# Patient Record
Sex: MALE | Race: WHITE | NOT HISPANIC OR LATINO | URBAN - METROPOLITAN AREA
[De-identification: names, ages, dates, MRNs, and addresses within clinical notes are randomized per-mention and may not be internally consistent; named-entity substitution may affect disease eponyms.]

---

## 2022-06-21 ENCOUNTER — INPATIENT (INPATIENT)
Facility: HOSPITAL | Age: 82
LOS: 10 days | Discharge: HOME | DRG: 949 | End: 2022-07-02
Attending: INTERNAL MEDICINE | Admitting: INTERNAL MEDICINE
Payer: MEDICARE

## 2022-06-21 VITALS
HEIGHT: 68 IN | TEMPERATURE: 98 F | OXYGEN SATURATION: 94 % | DIASTOLIC BLOOD PRESSURE: 103 MMHG | HEART RATE: 86 BPM | WEIGHT: 181.22 LBS | SYSTOLIC BLOOD PRESSURE: 145 MMHG | RESPIRATION RATE: 16 BRPM

## 2022-06-21 DIAGNOSIS — Z98.890 OTHER SPECIFIED POSTPROCEDURAL STATES: Chronic | ICD-10-CM

## 2022-06-21 DIAGNOSIS — Z90.49 ACQUIRED ABSENCE OF OTHER SPECIFIED PARTS OF DIGESTIVE TRACT: Chronic | ICD-10-CM

## 2022-06-21 DIAGNOSIS — S12.000A UNSPECIFIED DISPLACED FRACTURE OF FIRST CERVICAL VERTEBRA, INITIAL ENCOUNTER FOR CLOSED FRACTURE: ICD-10-CM

## 2022-06-21 RX ORDER — METOPROLOL TARTRATE 50 MG
25 TABLET ORAL DAILY
Refills: 0 | Status: DISCONTINUED | OUTPATIENT
Start: 2022-06-22 | End: 2022-07-02

## 2022-06-21 RX ORDER — POLYETHYLENE GLYCOL 3350 17 G/17G
17 POWDER, FOR SOLUTION ORAL DAILY
Refills: 0 | Status: DISCONTINUED | OUTPATIENT
Start: 2022-06-21 | End: 2022-06-22

## 2022-06-21 RX ORDER — LANOLIN ALCOHOL/MO/W.PET/CERES
3 CREAM (GRAM) TOPICAL AT BEDTIME
Refills: 0 | Status: DISCONTINUED | OUTPATIENT
Start: 2022-06-21 | End: 2022-07-02

## 2022-06-21 RX ORDER — ACETAMINOPHEN 500 MG
650 TABLET ORAL EVERY 6 HOURS
Refills: 0 | Status: DISCONTINUED | OUTPATIENT
Start: 2022-06-21 | End: 2022-07-02

## 2022-06-21 RX ORDER — LIDOCAINE 4 G/100G
1 CREAM TOPICAL
Refills: 0 | Status: DISCONTINUED | OUTPATIENT
Start: 2022-06-21 | End: 2022-07-02

## 2022-06-21 RX ORDER — PANTOPRAZOLE SODIUM 20 MG/1
40 TABLET, DELAYED RELEASE ORAL
Refills: 0 | Status: DISCONTINUED | OUTPATIENT
Start: 2022-06-21 | End: 2022-07-02

## 2022-06-21 RX ORDER — OXYCODONE HYDROCHLORIDE 5 MG/1
5 TABLET ORAL EVERY 4 HOURS
Refills: 0 | Status: DISCONTINUED | OUTPATIENT
Start: 2022-06-21 | End: 2022-06-21

## 2022-06-21 RX ORDER — SENNA PLUS 8.6 MG/1
2 TABLET ORAL AT BEDTIME
Refills: 0 | Status: DISCONTINUED | OUTPATIENT
Start: 2022-06-21 | End: 2022-06-22

## 2022-06-21 RX ORDER — ENOXAPARIN SODIUM 100 MG/ML
40 INJECTION SUBCUTANEOUS EVERY 24 HOURS
Refills: 0 | Status: DISCONTINUED | OUTPATIENT
Start: 2022-06-21 | End: 2022-06-22

## 2022-06-21 RX ADMIN — LIDOCAINE 1 PATCH: 4 CREAM TOPICAL at 17:49

## 2022-06-21 RX ADMIN — Medication 3 MILLIGRAM(S): at 20:35

## 2022-06-21 RX ADMIN — POLYETHYLENE GLYCOL 3350 17 GRAM(S): 17 POWDER, FOR SOLUTION ORAL at 17:50

## 2022-06-21 RX ADMIN — Medication 650 MILLIGRAM(S): at 20:19

## 2022-06-21 RX ADMIN — Medication 650 MILLIGRAM(S): at 21:22

## 2022-06-21 RX ADMIN — LIDOCAINE 1 PATCH: 4 CREAM TOPICAL at 19:00

## 2022-06-21 NOTE — PATIENT PROFILE ADULT - FALL HARM RISK - HARM RISK INTERVENTIONS

## 2022-06-21 NOTE — H&P ADULT - NSHPSOCIALHISTORY_GEN_ALL_CORE
SOCIAL HISTORY  Smoking - denies  EtOH - occasional  Marital Status -     FUNCTIONAL HISTORY      Previous Functional Status:  Independent in ambulation, ADL's, transfers prior to hospitalization    Current Functional Status:  Bed mobility:  Transfers:  Gait / ambulation:  ADL's:  Speech: SOCIAL HISTORY  Smoking - denies  EtOH - occasional  Marital Status -   Retired     FUNCTIONAL HISTORY  Patient lives with spouse in two homes, one in [place] with no SALOMÓN and no steps inside. [place] home is split level with 4-5 SALOMÓN and 4 SALOMÓN to main floor where bathroom and bedroom are located.  He was independent PTA without use of AD, played tennis, drives.  Adult daughter and son live locally. Son is Dr. Morris Bustos, surgical oncologist at [facility] [facility].    Current Functional Status:  Bed mobility:  Transfers:  Gait / ambulation:  ADL's:  Speech: SOCIAL HISTORY  Smoking - denies  EtOH - occasional  Marital Status -   Retired     FUNCTIONAL HISTORY  Patient lives with spouse in two homes, one in [place] with no SALOMÓN and no steps inside. [place] home is split level with 4-5 SALOMÓN and 4 SALOMÓN to main floor where bathroom and bedroom are located.  He was independent PTA without use of AD, played tennis, drives.  Adult daughter and son live locally. Son is Dr. Morris Bustos, surgical oncologist at [facility].    Current Functional Status:  Bed mobility:   Transfers:  Gait / ambulation:  ADL's:  Speech:

## 2022-06-21 NOTE — H&P ADULT - NSHPPHYSICALEXAM_GEN_ALL_CORE
Constitutional - NAD, Comfortable  HEENT - NCAT, EOMI  Neck - Supple, No limited ROM  Chest - good chest expansion, good respiratory effort, CTAB   Cardio - warm and well perfused, RRR, no murmur  Abdomen -  Soft, NTND  Extremities - No peripheral edema, No calf tenderness   Neurologic Exam:                    Cognitive -             Orientation: Awake, Alert, AAO to self, place, date, year, situation            Attention:  Days of week, recall 3 objects without cuing            Memory: Recent/ remote memory intact            Thought: process, content appropriate     Speech - Fluent, Comprehensible, No dysarthria, No aphasia      Cranial Nerves - No facial asymmetry, Tongue midline, EOMI, Shoulder shrug intact     Motor -                      LEFT    UE - ShAB 5/5, EF 5/5, EE 5/5, WE 5/5,  WNL                    RIGHT UE - ShAB 5/5, EF 5/5, EE 5/5, WE 5/5,  WNL                    LEFT    LE - HF 5/5, KE 5/5, DF 5/5, PF 5/5                    RIGHT LE - HF 5/5, KE 5/5, DF 5/5, PF 5/5        Sensory - Intact to LT bilateral     Reflexes - DTR _ / 4 , neg Felton's b/l, neg Babinski's b/l     Coordination - FTN / HTS intact     OculoVestibular -  No nystagmus  Psychiatric - Mood stable, Affect WNL  Skin on admission: Vital Signs Last 24 Hrs  T(C): 36.6 (21 Jun 2022 13:21), Max: 36.6 (21 Jun 2022 13:21)  T(F): 97.8 (21 Jun 2022 13:21), Max: 97.8 (21 Jun 2022 13:21)  HR: 86 (21 Jun 2022 13:21) (86 - 86)  BP: 145/103 (21 Jun 2022 13:21) (145/103 - 145/103)  RR: 16 (21 Jun 2022 13:21) (16 - 16)  SpO2: 94% (21 Jun 2022 13:21) (94% - 94%)    PHYSICAL EXAM  Constitutional - NAD, Comfortable  HEENT - NCAT, EOMI  Neck - Supple, No limited ROM  Chest - good chest expansion, good respiratory effort, CTAB   Cardio - regular HR, irregular rhythm  Abdomen -  Soft, NTND, decreased BS  Extremities - No peripheral edema, No calf tenderness. +right shin skin tear 2 cm x 2 cm. Both hands cool to touch, right hand edema  Neurologic Exam:                    Cognitive -             Orientation: Awake, Alert, AAO to self, place, date, year, situation            Memory: Recent/ remote memory intact            Thought: process, content appropriate     Speech - Fluent, Comprehensible, No dysarthria, No aphasia      Cranial Nerves - No facial asymmetry, Tongue midline, EOMI, Shoulder shrug intact     Motor -                      LEFT    UE - ShAB 5/5, EF 5/5, EE 5/5, WE 5/5,  WNL                    RIGHT UE - ShAB 3/5 (limited by pain with AROM restricted), EF 5/5, EE 5/5, WE 4/5,  3/5, finger abduction 2/5                    LEFT    LE - HF 5/5, KE 5/5, DF 5/5, PF 5/5                    RIGHT LE - HF 5/5, KE 5/5, DF 5/5, PF 5/5        Sensory - Intact to LT bilateral     Reflexes - DTR 2 / 4 , neg Felton's b/l, neg Babinski's b/l     Coordination - FTN / HTS intact     OculoVestibular -  No nystagmus  Psychiatric - Mood stable, Affect WNL  Skin on admission:

## 2022-06-21 NOTE — H&P ADULT - NSHPLABSRESULTS_GEN_ALL_CORE
Laboratory-Chemistry:    06/19/2022    Glucose: 101    Sodium: 138    Potassium: 4    Blood Urea Nitrogen : 13    Creatinine: 0.89    Hematology:    06/19/2022    WBC: 5.60    HgB: 12.5    Hct: 37    Platelets: 120       Cultures:     06/15/2022 COVID-19 PCR: Not detected       Radiology:     CXR 06/18:  Likely right basilar atelectasis    CXR 06/17:  No acute pulmonary process.    MRI brain 06/16: No acute intracranial finding    MRI CTL-spine 06/16:    Cervical Spine: Minimally displaced fracture involving spinous process of C2 with extension into the spinal canal. No evidence of epidural or subdural hematoma. Degenerative changes. No cord compression  Thoracic spine: No cord compression  Lumbar spine:  Degenerative changes    MRI brachial plexus, shoulder 06/17: Unremarkable examination of the brachial plexus.    MRI (??) shoulder 06/16: Moderate acromioclavicular and moderate glenohumeral arthrosis.  Posterior labral  tearing with mild cartilage loss and adjacent subchondral marrow edema posterior glenoid.  Tendinosis supraspinatus with suspected low grade articular sided tearing anterior most fibers.  Mild tendinosis infraspinatus and intra articular long head biceps.  Loss of fat and edema within the rotator interval.    06/16/2022 EKG:  A fib, incomplete Right BBB

## 2022-06-21 NOTE — H&P ADULT - NSHPREVIEWOFSYSTEMS_GEN_ALL_CORE
REVIEW OF SYSTEMS  Constitutional: No fever, No Chills, No fatigue  HEENT: No eye pain, No visual disturbances, No difficulty hearing  Pulm: No cough,  No shortness of breath  Cardio: No chest pain, No palpitations  GI:  No abdominal pain, No nausea, No vomiting, No diarrhea, No constipation  : No dysuria, No frequency, No hematuria  Neuro: No headaches, No memory loss, No loss of strength, No numbness, No tremors  Skin: No itching, No rashes, No lesions   Endo: No temperature intolerance  MSK: No joint pain, No joint swelling, No muscle pain, No Neck or back pain  Psych:  No depression, No anxiety REVIEW OF SYSTEMS  Constitutional: No fever, No Chills, No fatigue  HEENT: No eye pain, No visual disturbances, + difficulty hearing, +c-collar  Pulm: No cough,  No shortness of breath  Cardio: No chest pain, No palpitations  GI:  No abdominal pain, No nausea, No vomiting, No diarrhea, + constipation, Last BM 6/19  : No dysuria, No frequency, No hematuria  Neuro: No headaches, No memory loss, + loss of strength, + numbness and tingling in bilateral feet and hands, No tremors  Skin: No itching, No rashes, +right shin skin tear  Endo: No temperature intolerance, +cold hands  MSK: +right shoulder pain, No joint swelling, No muscle pain, No Neck or back pain  Psych:  No depression, No anxiety

## 2022-06-21 NOTE — H&P ADULT - ATTENDING COMMENTS
Seen and examined    80 yo Male with Hx Afib on Warfarin, HTN, right rotator cuff tear.  Initially admitted to [facility] 6/15/22 after a fall at home, Dx with C2 fracture and transferred to [facility], Neurosurg. recommended non surgical management. Hospital course c/b RUE weakness and bilateral shoulder pain. Dx with right rotator cuff tear. MRI R brachial plexus unremarkable. Plan for outpatient EMG.  Maintain [place] J collar as noted  Acute rehab admission 6/21/22.    Pleasant, wife participated on phone during review  Fully independent, plays law tennis, no gait assistance device use  Fell backwards, while sitting on a chair, playing cards with friends, no LOC or seizures.    Now has tingling/numbness diffusely b/l UE worse on fingers, no neck pain  Has low intensity/intermittent Rt low back pain, non radiatory, no bowel/bladder dysfunction of saddle anesthesia  Gait is unstable  On coumadin at home for A fib    Clinically stable  HEENT--C collar in situ, no skin issues neck area  Clear chest   Abd soft  Ext--skin tear Rt mid shin-pre tibial area 2x2cm with some erythema    AAO X 3  Tone normal  MMT--5/5 except RUE 3+/5 due to shoulder pain with movement, Abd to 80deg flexion to 100deg, pain on end ROM  Mild TTP diffused over Rt shoulder  Reflexes 2+ upper brisk both patella  Tinel's and Phalen ? +ve on right  Gait--increased sway -dynamic gait  No cognitive deficits                        13.5   5.30  )-----------( 131      ( 22 Jun 2022 05:35 )             39.4     06-22    137  |  103  |  20  ----------------------------<  103<H>  4.1   |  30  |  0.82    Ca    9.6      22 Jun 2022 05:35    TPro  6.2  /  Alb  3.3  /  TBili  0.8  /  DBili  x   /  AST  89<H>  /  ALT  88<H>  /  AlkPhos  53  06-22    PT/INR - ( 22 Jun 2022 05:35 )   PT: 14.2 sec;   INR: 1.22 ratio        Dx C2 Fracture  - Commence Comprehensive Multidisciplinary Rehab Program for Gait, ADL, Functional impairments  - PT/OT 3 hours a day 5 days a week  - C Collar for OOB activity- clarified with patient's neurosurgery team. May remove C collar while in bed or for showers when supervised, Continue C collar for 6-8 weeks until seen by neurosurgeon.  - Outpatient follow up with Dr. So ([phone], press 9)  - Precautions: Cervical spine, falls, aspiration    Afib--f/u plan as stated by hospitalist, commence coumadin today, and monitor INR, D/C Lovenox  Continue all supportive treatments    Skin tear right shin--bacitracin topical   Continue bowel regimen--senna nightly, worried about loose BM  est dc to be decided at team conference

## 2022-06-21 NOTE — H&P ADULT - NSICDXPASTSURGICALHX_GEN_ALL_CORE_FT
PAST SURGICAL HISTORY:  History of bilateral inguinal hernia repair     History of umbilical hernia repair     Status post appendectomy

## 2022-06-21 NOTE — H&P ADULT - ASSESSMENT
(age and gender) with hx of (pertinent PMH) who presented to (acute hospital) on (date of acute admission) with (presenting sx) found to have (rehab diagnosis). Hospital Course complicated by ___. Admitted for multidisciplinary rehab program    Comprehensive Multidisciplinary Rehab Program:  - Gait, ADL, Functional impairments  - CMS Impairment group:   - PT/OT/ SLP 3 hours a day 7 days a week  - NP consult for support and assessment     ***    Mood / Cognition:  - Neuropsych evaluation     Sleep:  - Maintain quiet hours and low stim environment  - Monitor sleep logs  - Melatonin PRN    Pain:  - Tylenol PRN  - avoid sedating meds that may affect cognitive recovery    GI/Bowel:  - Senna 2 tabs daily  - GI ppx:     /Bladder:  - Monitor PVR if no void in 8h; SC for >400 cc  - Toileting schedule q4h    Diet / Dysphagia:    - Diet:   - ongoing SLP assessment  - Nutrition to follow    Skin/ Pressure Injury Prevention:  - assessment on admission   - Incisions:  -Turn Q2hrs in bed while awake, OOB to Chair, PT/OT/SLP     DVT prophylaxis:  -   - SCDs  - Last doppler on ___    Precautions/ Restrictions  - Falls, Cardiac, Seizures, Spine, Hip  - Ortho:      Weight bearing status:     ROM restrictions:   - Lungs: Aspiration, Incentive Spirometer    - COVID PCR:     --------------------------------------------  Outpatient Follow up:  Neurology Clinic (for EMG)  [address] [place] [address]  [phone]  --Within 1 month    Orthopedics Clinic  [address] [place] [address]  [phone]  ---Within 6 weeks of acute hospital discharge    Trauma Physician  [address] [place] [address]  [phone]  -- Within 7-10 days  --------------------------------------------      MEDICAL PROGNOSIS: GOOD            REHAB POTENTIAL: GOOD             ESTIMATED DISPOSITION: HOME WITH HOME CARE            ELOS: 10-14 Days   EXPECTED THERAPY:     P.T. 1hr/day       O.T. 1hr/day      S.L.P. 1hr/day     P&O Unnecessary     EXP FREQUENCY: 5 days per 7 day period     PRESCREEN COMPARISION:   I have reviewed the prescreen information and I have found no relevant changes between the preadmission screening and my post admission evaluation     RATIONALE FOR INPATIENT ADMISSION - Patient demonstrates the following: (check all that apply)  [X] Medically appropriate for rehabilitation admission  [X] Has attainable rehab goals with an appropriate initial discharge plan  [X] Has rehabilitation potential (expected to make a significant improvement within a reasonable period of time)   [X] Requires close medical management by a rehab physician, rehab nursing care, Hospitalist and comprehensive interdisciplinary team (including PT, OT, & or SLP, Prosthetics and Orthotics)   82 yo RHD Male with PMhx of Afib on Warfarin, HTN, right rotator cuff tear presented to [facility] 6/15/22 after he hit the back of his head falling off a chair. Found to have C2 fracture and transferred to [facility], non surgical management. Hospital course c/b RUE weakness and bilateral shoulder pain. MRI spine was done for persistent RUE weakness showed no additional injuries. C Maintain [place] J collar at all times. Admitted for multidisciplinary rehab program 6/21/22.    #C2 Fracture  - Start Comprehensive Multidisciplinary Rehab Program for Gait, ADL, Functional impairments  - PT/OT 3 hours a day 5 days a week  - C Collar at all times for 6-8 weeks until seen by neurosurgeon  - Pt states surgeon told him he could take off C-collar in bed and for showers but instructions not specified in dc instructions --- 6/21 msg left for Dr. So for clarification  - Outpatient follow up with Dr. So ([phone], press 9)  - Precautions: Cervical spine, falls, aspiration    #Afib  - metoprolol succinate ER 25 mg daily  - Resume Coumadin on 6/22 -- currently on Lovenox- clarify with hospitalist if bridging is needed  - At home patient takes  5mg 5 days/week and 2.5mg 2 days per week.     #Right hand weakness  - Will need UE EMG in 3-4 weeks  - MRI of brachial plexus on 6/17 unremarkable    #Right rotator cuff tear  #Bilateral shoulder pain  - Lidocaine patches to both shoulders  - Cold/warm compresses PRN    #Migraines  - Pt takes Immitrex 25mg daily as needed, may repeat after 2 hrs if headache not aborted  - Hold medication, order PRN if needed    Sleep:  - Maintain quiet hours and low stim environment  - Melatonin 3mg at qhs    Pain:  - Tylenol PRN, lidocaine patches to shoulders, cold compresses PRN    GI/Bowel:  - Senna 2 tabs at qhs, Miralax daily    /Bladder:  - Monitor PVR on admission; SC for >400 cc    Diet: Regular     Skin/ Pressure Injury Prevention:  - assessment on admission-- skin tear on right shin approx 2cm x 2 xm, no pressure ulcers  - Incisions: none  -OOB to Chair, PT/OT     DVT prophylaxis:  - Lovenox 40  - start Coumadin on 6/22  - SCDs      --------------------------------------------  Outpatient Follow up:  Neurology Clinic (for EMG)  [address] [place] [id number] [phone]  --Within 1 month    Orthopedics Clinic  [address] [place] [address]  [phone]  ---Within 6 weeks of acute hospital discharge    Trauma Physician  [address] [place] [address]  [phone]  -- Within 7-10 days  --------------------------------------------      MEDICAL PROGNOSIS: GOOD            REHAB POTENTIAL: GOOD             ESTIMATED DISPOSITION: HOME WITH HOME CARE            ELOS: 10-14 Days   EXPECTED THERAPY:     P.T. 2hr/day       O.T. 1hr/day      S.L.P. 0 hr/day     P&O Unnecessary     EXP FREQUENCY: 5 days per 7 day period     PRESCREEN COMPARISION:   I have reviewed the prescreen information and I have found no relevant changes between the preadmission screening and my post admission evaluation     RATIONALE FOR INPATIENT ADMISSION - Patient demonstrates the following: (check all that apply)  [X] Medically appropriate for rehabilitation admission  [X] Has attainable rehab goals with an appropriate initial discharge plan  [X] Has rehabilitation potential (expected to make a significant improvement within a reasonable period of time)   [X] Requires close medical management by a rehab physician, rehab nursing care, Hospitalist and comprehensive interdisciplinary team (including PT, OT, & or SLP, Prosthetics and Orthotics)   82 yo RHD Male with PMhx of Afib on Warfarin, HTN, right rotator cuff tear presented to [facility] 6/15/22 after he hit the back of his head falling off a chair. Found to have C2 fracture and transferred to [facility], non surgical management. Hospital course c/b RUE weakness and bilateral shoulder pain. Found to have right rotator cuff tear. MRI R brachial plexus unremarkable. Plan for outpatient EMG. C Maintain [place] J collar at all times. Admitted for multidisciplinary rehab program 6/21/22.    #C2 Fracture  - Start Comprehensive Multidisciplinary Rehab Program for Gait, ADL, Functional impairments  - PT/OT 3 hours a day 5 days a week  - C Collar at all times for 6-8 weeks until seen by neurosurgeon  - Pt states surgeon told him he could take off C-collar in bed and for showers but instructions not specified in dc instructions --- 6/21 msg left for Dr. So for clarification  - Outpatient follow up with Dr. So ([phone], press 9)  - Precautions: Cervical spine, falls, aspiration    #Afib  - metoprolol succinate ER 25 mg daily  - Resume Coumadin on 6/22 -- currently on Lovenox- clarify with hospitalist if bridging is needed  - At home patient takes  5mg 5 days/week and 2.5mg 2 days per week.     #Right hand weakness  - Will need UE EMG in 3-4 weeks  - MRI of brachial plexus on 6/17 unremarkable    #Right rotator cuff tear  #Bilateral shoulder pain  - Lidocaine patches to both shoulders  - Cold/warm compresses PRN    #Migraines  - Pt takes Immitrex 25mg daily as needed, may repeat after 2 hrs if headache not aborted  - Hold medication, order PRN if needed    Sleep:  - Maintain quiet hours and low stim environment  - Melatonin 3mg at qhs    Pain:  - Tylenol PRN, lidocaine patches to shoulders, cold compresses PRN    GI/Bowel:  - Senna 2 tabs at qhs, Miralax daily    /Bladder:  - Monitor PVR on admission; SC for >400 cc    Diet: Regular     Skin/ Pressure Injury Prevention:  - assessment on admission-- skin tear on right shin approx 2cm x 2 xm, no pressure ulcers  - Incisions: none  -OOB to Chair, PT/OT     DVT prophylaxis:  - Lovenox 40  - start Coumadin on 6/22  - SCDs      --------------------------------------------  Outpatient Follow up:  Neurology Clinic (for EMG)  [address] [place] [address]  [phone]  --Within 1 month    Orthopedics Clinic  [address] [place] [address]  [phone]  ---Within 6 weeks of acute hospital discharge    Trauma Physician  [address] [address]  [phone]  -- Within 7-10 days  --------------------------------------------      MEDICAL PROGNOSIS: GOOD            REHAB POTENTIAL: GOOD             ESTIMATED DISPOSITION: HOME WITH HOME CARE            ELOS: 10-14 Days   EXPECTED THERAPY:     P.T. 2hr/day       O.T. 1hr/day      S.L.P. 0 hr/day     P&O Unnecessary     EXP FREQUENCY: 5 days per 7 day period     PRESCREEN COMPARISION:   I have reviewed the prescreen information and I have found no relevant changes between the preadmission screening and my post admission evaluation     RATIONALE FOR INPATIENT ADMISSION - Patient demonstrates the following: (check all that apply)  [X] Medically appropriate for rehabilitation admission  [X] Has attainable rehab goals with an appropriate initial discharge plan  [X] Has rehabilitation potential (expected to make a significant improvement within a reasonable period of time)   [X] Requires close medical management by a rehab physician, rehab nursing care, Hospitalist and comprehensive interdisciplinary team (including PT, OT, & or SLP, Prosthetics and Orthotics)   80 yo RHD Male with PMhx of Afib on Warfarin, HTN, right rotator cuff tear presented to [facility] 6/15/22 after he hit the back of his head falling off a chair. Found to have C2 fracture and transferred to [facility], non surgical management. Hospital course c/b RUE weakness and bilateral shoulder pain. Found to have right rotator cuff tear. MRI R brachial plexus unremarkable. Plan for outpatient EMG. C Maintain [place] J collar at all times. Admitted for multidisciplinary rehab program 6/21/22.    #C2 Fracture  - Start Comprehensive Multidisciplinary Rehab Program for Gait, ADL, Functional impairments  - PT/OT 3 hours a day 5 days a week  - C Collar for OOB activity- clarified with patient's neurosurgery team. May remove C collar while in bed or for showers when supervised, Continue C collar for 6-8 weeks until seen by neurosurgeon.  - Outpatient follow up with Dr. So ([phone], press 9)  - Precautions: Cervical spine, falls, aspiration    #Afib  - metoprolol succinate ER 25 mg daily  - Resume Coumadin on 6/22 -- currently on Lovenox- clarify with hospitalist if bridging is needed  - At home patient takes  5mg 5 days/week and 2.5mg 2 days per week.     #Right hand weakness  - Will need UE EMG in 3-4 weeks  - MRI of brachial plexus on 6/17 unremarkable    #Right rotator cuff tear  #Bilateral shoulder pain  - Lidocaine patches to both shoulders  - Cold/warm compresses PRN    #Migraines  - Pt takes Immitrex 25mg daily as needed, may repeat after 2 hrs if headache not aborted  - Hold medication, order PRN if needed    Sleep:  - Maintain quiet hours and low stim environment  - Melatonin 3mg at qhs    Pain:  - Tylenol PRN, lidocaine patches to shoulders, cold compresses PRN    GI/Bowel:  - Senna 2 tabs at qhs, Miralax daily    /Bladder:  - Monitor PVR on admission; SC for >400 cc    Diet: Regular     Skin/ Pressure Injury Prevention:  - assessment on admission-- skin tear on right shin 2cm x 2 cm, no pressure ulcers  - Incisions: none  -OOB to Chair, PT/OT     DVT prophylaxis:  - Lovenox 40  - start Coumadin on 6/22  - SCDs      --------------------------------------------  Outpatient Follow up:  Neurology Clinic (for EMG)  [address] [place] [id number] [phone]  --Within 1 month    Orthopedics Clinic  [address]  [place] [id number] [phone]  ---Within 6 weeks of acute hospital discharge    Trauma Physician  [address] [place], [place] [id number] [phone]  -- Within 7-10 days  --------------------------------------------      MEDICAL PROGNOSIS: GOOD            REHAB POTENTIAL: GOOD             ESTIMATED DISPOSITION: HOME WITH HOME CARE            ELOS: 10-14 Days   EXPECTED THERAPY:     P.T. 2hr/day       O.T. 1hr/day      S.L.P. 0 hr/day     P&O Unnecessary     EXP FREQUENCY: 5 days per 7 day period     PRESCREEN COMPARISION:   I have reviewed the prescreen information and I have found no relevant changes between the preadmission screening and my post admission evaluation     RATIONALE FOR INPATIENT ADMISSION - Patient demonstrates the following: (check all that apply)  [X] Medically appropriate for rehabilitation admission  [X] Has attainable rehab goals with an appropriate initial discharge plan  [X] Has rehabilitation potential (expected to make a significant improvement within a reasonable period of time)   [X] Requires close medical management by a rehab physician, rehab nursing care, Hospitalist and comprehensive interdisciplinary team (including PT, OT, & or SLP, Prosthetics and Orthotics)

## 2022-06-21 NOTE — H&P ADULT - HISTORY OF PRESENT ILLNESS
Patient is a 82 yo M with PMhx of Afib on Warfarin, HTN, right rotator cuff tear who presented to [facility] 6/15/22 after he hit the back of his head falling off a chair. Found to have C2 fracture and transferred to [facility] for further management. Patient states he remembers the incident but could not speak or move at all afterward. Also presented with RUE weakness and bilateral shoulder pain. C-spine showed multilevel degenerative changes of C6-7 and C7-T1 with moderate narrowing of the central canal as well as moderate bilateral neural foraminal stenosis, endplate degenerative changes at C7-T1, grade 1 degenerative retrolithesis of C6-C7, and grade 1 degenerative anterolisthesis of C7-T1. 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 MRI spine was done for persistent RUE weakness showed no additional injuries. Conservative management per neurosurgery. Maintain [place] J collar at all times until follow up with Dr. So in 6-8 weeks. May resume Wafarin on 6/22. At home patient takes 5mg 5 days/week and 2.5mg 2 days per week. Will need UE EMG in 3-4 weeks.    Please obtain upright C spine XR??    . on Imitrex 25 mg daily as needed for migraine, may repeat dose in 2 hours.    Last Bm 6/19/22. Right shin skin tear. Numbness and tingling in extremities  Covid PCR neg 6/20 Patient is a 82 yo RHD Male with PMhx of Afib on Warfarin, HTN, right rotator cuff tear who presented to [facility] on 6/15/22 after he hit the back of his head falling off a chair. Found to have C2 fracture and transferred to [facility] for further management. Patient states he remembers the incident but could not speak or move at all afterward. Also presented with RUE weakness and bilateral shoulder pain. C-spine showed multilevel degenerative changes of C6-7 and C7-T1 with moderate narrowing of the central canal as well as moderate bilateral neural foraminal stenosis, endplate degenerative changes at C7-T1, grade 1 degenerative retrolithesis of C6-C7, and grade 1 degenerative anterolisthesis of C7-T1. 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 MRI shoulder (suspect Right shoulder given pt's complaint) showed moderate AC and GH arthrosis, posterior labral tear, supraspinatus tendinosis with suspected low grade articular sided tearing anterior most fibers, mild infraspinatus and long head of biceps tendinosis. MRI brachial plexus was for persistent RUE weakness was unremarkable. Conservative management per neurosurgery. Maintain [place] J collar at all times until follow up with Dr. So in 6-8 weeks. May resume Wafarin on 6/22. At home patient takes 5mg 5 days/week and 2.5mg 2 days per week. Will need UE EMG in 3-4 weeks.    Patient was evaluated by PM&R and therapy for functional deficits and gait/ ADL impairments and recommended acute rehabilitation. Patient was medically optimized for discharge to [facility] on 6/21/22.    Patient seen and examined on arrival. Patient states he was playing cards on his deck when his chair came off the edge of the deck and he fell backward, striking his head. He reports right shoulder pain since fall that is worse than his usual soreness after playing tennis. Unable to range right shoulder into full flexion. Also reports numbness and tingling worse in right hand than left hand, plus right hand edema.       Patient is a 82 yo RHD Male with PMhx of Afib on Warfarin, HTN, right rotator cuff tear who presented to [facility] on 6/15/22 after he hit the back of his head falling off a chair. Found to have C2 fracture and transferred to [facility] for further management. Patient states he remembers the incident but could not speak or move at all afterward. Also presented with RUE weakness and bilateral shoulder pain. C-spine showed multilevel degenerative changes of C6-7 and C7-T1 with moderate narrowing of the central canal as well as moderate bilateral neural foraminal stenosis, endplate degenerative changes at C7-T1, grade 1 degenerative retrolithesis of C6-C7, and grade 1 degenerative anterolisthesis of C7-T1. 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 MRI shoulder (suspect Right shoulder given pt's complaint) showed moderate AC and GH arthrosis, posterior labral tear, supraspinatus tendinosis with suspected low grade articular sided tearing anterior most fibers, mild infraspinatus and long head of biceps tendinosis. MRI brachial plexus was for persistent RUE weakness was unremarkable. Conservative management per neurosurgery. May resume Wafarin on 6/22. At home patient takes 5mg 5 days/week and 2.5mg 2 days per week. Will need UE EMG in 3-4 weeks.    Patient was evaluated by PM&R and therapy for functional deficits and gait/ ADL impairments and recommended acute rehabilitation. Patient was medically optimized for discharge to [facility] on 6/21/22.    Patient seen and examined on arrival. Patient states he was playing cards on his deck when his chair came off the edge of the deck and he fell backward, striking his head. He reports right shoulder pain since fall that is worse than his usual soreness after playing tennis. Unable to range right shoulder into full flexion. Also reports numbness and tingling worse in right hand than left hand, plus right hand edema.

## 2022-06-21 NOTE — H&P ADULT - NSICDXFAMILYHX_GEN_ALL_CORE_FT
FAMILY HISTORY:  No pertinent family history in first degree relatives FAMILY HISTORY:  Father  Still living? Unknown  Family hx of colon cancer, Age at diagnosis: Age Unknown

## 2022-06-22 DIAGNOSIS — S12.100A UNSPECIFIED DISPLACED FRACTURE OF SECOND CERVICAL VERTEBRA, INITIAL ENCOUNTER FOR CLOSED FRACTURE: ICD-10-CM

## 2022-06-22 LAB
ALBUMIN SERPL ELPH-MCNC: 3.3 G/DL — SIGNIFICANT CHANGE UP (ref 3.3–5)
ALP SERPL-CCNC: 53 U/L — SIGNIFICANT CHANGE UP (ref 40–120)
ALT FLD-CCNC: 88 U/L — HIGH (ref 10–45)
ANION GAP SERPL CALC-SCNC: 4 MMOL/L — LOW (ref 5–17)
AST SERPL-CCNC: 89 U/L — HIGH (ref 10–40)
BASOPHILS # BLD AUTO: 0.04 K/UL — SIGNIFICANT CHANGE UP (ref 0–0.2)
BASOPHILS NFR BLD AUTO: 0.8 % — SIGNIFICANT CHANGE UP (ref 0–2)
BILIRUB SERPL-MCNC: 0.8 MG/DL — SIGNIFICANT CHANGE UP (ref 0.2–1.2)
BUN SERPL-MCNC: 20 MG/DL — SIGNIFICANT CHANGE UP (ref 7–23)
CALCIUM SERPL-MCNC: 9.6 MG/DL — SIGNIFICANT CHANGE UP (ref 8.4–10.5)
CHLORIDE SERPL-SCNC: 103 MMOL/L — SIGNIFICANT CHANGE UP (ref 96–108)
CO2 SERPL-SCNC: 30 MMOL/L — SIGNIFICANT CHANGE UP (ref 22–31)
CREAT SERPL-MCNC: 0.82 MG/DL — SIGNIFICANT CHANGE UP (ref 0.5–1.3)
EGFR: 89 ML/MIN/1.73M2 — SIGNIFICANT CHANGE UP
EOSINOPHIL # BLD AUTO: 0.07 K/UL — SIGNIFICANT CHANGE UP (ref 0–0.5)
EOSINOPHIL NFR BLD AUTO: 1.3 % — SIGNIFICANT CHANGE UP (ref 0–6)
GLUCOSE SERPL-MCNC: 103 MG/DL — HIGH (ref 70–99)
HCT VFR BLD CALC: 39.4 % — SIGNIFICANT CHANGE UP (ref 39–50)
HGB BLD-MCNC: 13.5 G/DL — SIGNIFICANT CHANGE UP (ref 13–17)
IMM GRANULOCYTES NFR BLD AUTO: 0.4 % — SIGNIFICANT CHANGE UP (ref 0–1.5)
INR BLD: 1.22 RATIO — HIGH (ref 0.88–1.16)
LYMPHOCYTES # BLD AUTO: 1.46 K/UL — SIGNIFICANT CHANGE UP (ref 1–3.3)
LYMPHOCYTES # BLD AUTO: 27.5 % — SIGNIFICANT CHANGE UP (ref 13–44)
MCHC RBC-ENTMCNC: 30.5 PG — SIGNIFICANT CHANGE UP (ref 27–34)
MCHC RBC-ENTMCNC: 34.3 GM/DL — SIGNIFICANT CHANGE UP (ref 32–36)
MCV RBC AUTO: 89.1 FL — SIGNIFICANT CHANGE UP (ref 80–100)
MONOCYTES # BLD AUTO: 0.5 K/UL — SIGNIFICANT CHANGE UP (ref 0–0.9)
MONOCYTES NFR BLD AUTO: 9.4 % — SIGNIFICANT CHANGE UP (ref 2–14)
NEUTROPHILS # BLD AUTO: 3.21 K/UL — SIGNIFICANT CHANGE UP (ref 1.8–7.4)
NEUTROPHILS NFR BLD AUTO: 60.6 % — SIGNIFICANT CHANGE UP (ref 43–77)
NRBC # BLD: 0 /100 WBCS — SIGNIFICANT CHANGE UP (ref 0–0)
PLATELET # BLD AUTO: 131 K/UL — LOW (ref 150–400)
POTASSIUM SERPL-MCNC: 4.1 MMOL/L — SIGNIFICANT CHANGE UP (ref 3.5–5.3)
POTASSIUM SERPL-SCNC: 4.1 MMOL/L — SIGNIFICANT CHANGE UP (ref 3.5–5.3)
PROT SERPL-MCNC: 6.2 G/DL — SIGNIFICANT CHANGE UP (ref 6–8.3)
PROTHROM AB SERPL-ACNC: 14.2 SEC — HIGH (ref 10.5–13.4)
RBC # BLD: 4.42 M/UL — SIGNIFICANT CHANGE UP (ref 4.2–5.8)
RBC # FLD: 12.8 % — SIGNIFICANT CHANGE UP (ref 10.3–14.5)
SARS-COV-2 RNA SPEC QL NAA+PROBE: SIGNIFICANT CHANGE UP
SODIUM SERPL-SCNC: 137 MMOL/L — SIGNIFICANT CHANGE UP (ref 135–145)
WBC # BLD: 5.3 K/UL — SIGNIFICANT CHANGE UP (ref 3.8–10.5)
WBC # FLD AUTO: 5.3 K/UL — SIGNIFICANT CHANGE UP (ref 3.8–10.5)

## 2022-06-22 PROCEDURE — 99223 1ST HOSP IP/OBS HIGH 75: CPT

## 2022-06-22 RX ORDER — POLYETHYLENE GLYCOL 3350 17 G/17G
17 POWDER, FOR SOLUTION ORAL
Refills: 0 | Status: DISCONTINUED | OUTPATIENT
Start: 2022-06-22 | End: 2022-06-24

## 2022-06-22 RX ORDER — WARFARIN SODIUM 2.5 MG/1
5 TABLET ORAL ONCE
Refills: 0 | Status: COMPLETED | OUTPATIENT
Start: 2022-06-22 | End: 2022-06-22

## 2022-06-22 RX ORDER — SENNA PLUS 8.6 MG/1
2 TABLET ORAL AT BEDTIME
Refills: 0 | Status: DISCONTINUED | OUTPATIENT
Start: 2022-06-22 | End: 2022-07-02

## 2022-06-22 RX ADMIN — PANTOPRAZOLE SODIUM 40 MILLIGRAM(S): 20 TABLET, DELAYED RELEASE ORAL at 06:04

## 2022-06-22 RX ADMIN — LIDOCAINE 1 PATCH: 4 CREAM TOPICAL at 05:00

## 2022-06-22 RX ADMIN — WARFARIN SODIUM 5 MILLIGRAM(S): 2.5 TABLET ORAL at 21:04

## 2022-06-22 RX ADMIN — Medication 3 MILLIGRAM(S): at 21:04

## 2022-06-22 RX ADMIN — POLYETHYLENE GLYCOL 3350 17 GRAM(S): 17 POWDER, FOR SOLUTION ORAL at 11:47

## 2022-06-22 RX ADMIN — POLYETHYLENE GLYCOL 3350 17 GRAM(S): 17 POWDER, FOR SOLUTION ORAL at 21:05

## 2022-06-22 RX ADMIN — Medication 25 MILLIGRAM(S): at 06:03

## 2022-06-22 RX ADMIN — SENNA PLUS 2 TABLET(S): 8.6 TABLET ORAL at 21:05

## 2022-06-22 NOTE — DIETITIAN INITIAL EVALUATION ADULT - ORAL INTAKE PTA/DIET HISTORY
Patient Does Not Follow Diet @Home  Consumes 3 Meals a Day   Wife Usually Cooks For Patient   Does Take Vitamin/Supplements @Home (Multivitamin, Vitamin B Complex & Vitamin D)

## 2022-06-22 NOTE — CONSULT NOTE ADULT - SUBJECTIVE AND OBJECTIVE BOX
Patient is a 80 yo RHD Male with PMhx of Afib on Warfarin, HTN, right rotator cuff tear who presented to [facility] on 6/15/22 after he hit the back of his head falling off a chair. Found to have C2 fracture and transferred to [facility] for further management. Patient states he remembers the incident but could not speak or move at all afterward. Also presented with RUE weakness and bilateral shoulder pain. C-spine showed multilevel degenerative changes of C6-7 and C7-T1 with moderate narrowing of the central canal as well as moderate bilateral neural foraminal stenosis, endplate degenerative changes at C7-T1, grade 1 degenerative retrolithesis of C6-C7, and grade 1 degenerative anterolisthesis of C7-T1. 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 MRI shoulder (suspect Right shoulder given pt's complaint) showed moderate AC and GH arthrosis, posterior labral tear, supraspinatus tendinosis with suspected low grade articular sided tearing anterior most fibers, mild infraspinatus and long head of biceps tendinosis. MRI brachial plexus was for persistent RUE weakness was unremarkable. Conservative management per neurosurgery. May resume Wafarin on 6/22. At home patient takes 5mg 5 days/week and 2.5mg 2 days per week. Will need UE EMG in 3-4 weeks.    Patient was evaluated by PM&R and therapy for functional deficits and gait/ ADL impairments and recommended acute rehabilitation. Patient was medically optimized for discharge to [facility] on 6/21/22.    Patient denies chest pain, SOB, N/V, abdominal pain.  He is tolerating a diet.  His last BM was yesterday.  He did not sleep well last night.  Reports numbness and tingling worse in right hand than left hand, plus right hand edema.    PAST MEDICAL & SURGICAL HISTORY:  HTN (hypertension)  Atrial fibrillation  Migraine  History of bilateral inguinal hernia repair  History of umbilical hernia repair  Status post appendectomy    FAMILY HISTORY  Father passed away from colon cancer, mother passed away at age of 105    SOCIAL HISTORY  Substance Use (street drugs): ( x ) never used  (  ) other:  Tobacco Usage:  (  x ) never smoked   (   ) former smoker   (   ) current smoker  (     ) pack year  Alcohol Usage:Denies  Sexual History: With wife  Recent Travel:Denies      Allergies  No Known Allergies  Intolerances    MEDICATIONS  acetaminophen     Tablet .. 650 milliGRAM(s) Oral every 6 hours PRN  enoxaparin Injectable 40 milliGRAM(s) SubCutaneous every 24 hours  lidocaine   4% Patch 1 Patch Transdermal <User Schedule>  melatonin 3 milliGRAM(s) Oral at bedtime  metoprolol succinate ER 25 milliGRAM(s) Oral daily  pantoprazole    Tablet 40 milliGRAM(s) Oral before breakfast  polyethylene glycol 3350 17 Gram(s) Oral daily  senna 2 Tablet(s) Oral at bedtime PRN      REVIEW OF SYSTEMS:  CONSTITUTIONAL: No fever, weight loss, or fatigue  EYES: No eye pain, visual disturbances, or discharge  ENMT:  No difficulty hearing, tinnitus, vertigo; No sinus or throat pain  NECK: No pain or stiffness  BREASTS: No pain, masses, or nipple discharge  RESPIRATORY: No cough, wheezing, chills or hemoptysis; No shortness of breath  CARDIOVASCULAR: No chest pain, palpitations, dizziness, or leg swelling  GASTROINTESTINAL: No abdominal or epigastric pain. No nausea, vomiting, or hematemesis; No diarrhea or constipation. No melena or hematochezia.  GENITOURINARY: No dysuria, frequency, hematuria, or incontinence  NEUROLOGICAL: No headaches, memory loss, loss of strength, numbness, or tremors  SKIN: No itching, burning, rashes, or lesions   LYMPH NODES: No enlarged glands  ENDOCRINE: No heat or cold intolerance; No hair loss  MUSCULOSKELETAL: RIght hand numbness,  No joint pain or swelling; No muscle, back, or extremity pain  PSYCHIATRIC: No depression, anxiety, mood swings, or difficulty sleeping  HEME/LYMPH: No easy bruising, or bleeding gums  ALLERY AND IMMUNOLOGIC: No hives or eczema    ALL ROS REVIEWED AND NORMAL EXCEPT AS STATED ABOVE    T(C): 36.4 (06-22-22 @ 09:33), Max: 36.7 (06-21-22 @ 20:28)  HR: 69 (06-22-22 @ 09:33) (69 - 95)  BP: 122/90 (06-22-22 @ 09:33) (122/90 - 165/90)  RR: 16 (06-22-22 @ 09:33) (16 - 16)  SpO2: 98% (06-22-22 @ 09:33) (94% - 98%)  Wt(kg): --Vital Signs Last 24 Hrs  T(C): 36.4 (22 Jun 2022 09:33), Max: 36.7 (21 Jun 2022 20:28)  T(F): 97.5 (22 Jun 2022 09:33), Max: 98 (21 Jun 2022 20:28)  HR: 69 (22 Jun 2022 09:33) (69 - 95)  BP: 122/90 (22 Jun 2022 09:33) (122/90 - 165/90)  BP(mean): --  RR: 16 (22 Jun 2022 09:33) (16 - 16)  SpO2: 98% (22 Jun 2022 09:33) (94% - 98%)    PHYSICAL EXAM:  GENERAL: NAD, AAOx3, speaking in full sentences, well-groomed, well-developed  HEAD:  Atraumatic, Normocephalic  EYES: EOMI, PERRLA, conjunctiva and sclera clear  ENMT: No tonsillar erythema, exudates, or enlargement; Moist mucous membranes, Good dentition, No lesions  NECK: Supple, No JVD, Normal thyroid  NERVOUS SYSTEM:  Alert & Oriented X3, Good concentration; Motor Strength 5/5 B/L upper and lower extremities; DTRs 2+ intact and symmetric  CHEST/LUNG: Clear to percussion bilaterally; No rales, rhonchi, wheezing, or rubs  HEART: Regular rate and rhythm; No murmurs, rubs, or gallops  ABDOMEN: Soft, Nontender, Nondistended; Bowel sounds present  EXTREMITIES:  Right had MS decreased, cannot make a full fist, 2+ Peripheral Pulses, No clubbing, cyanosis, or edema  LYMPH: No lymphadenopathy noted  SKIN: No rashes or lesions    LABS:                        13.5   5.30  )-----------( 131      ( 22 Jun 2022 05:35 )             39.4     06-22    137  |  103  |  20  ----------------------------<  103<H>  4.1   |  30  |  0.82    Ca    9.6      22 Jun 2022 05:35    TPro  6.2  /  Alb  3.3  /  TBili  0.8  /  DBili  x   /  AST  89<H>  /  ALT  88<H>  /  AlkPhos  53  06-22    PT/INR - ( 22 Jun 2022 05:35 )   PT: 14.2 sec;   INR: 1.22 ratio      CAPILLARY BLOOD GLUCOSE    RADIOLOGY & ADDITIONAL TESTS:  No new radiology    Consultant(s) Notes Reviewed:  [x ] YES  [ ] NO  Care Discussed with Consultants/Other Providers [ x] YES  [ ] NO  Imaging Personally Reviewed:  [ ] YES  [ x] NO

## 2022-06-22 NOTE — DIETITIAN INITIAL EVALUATION ADULT - REASON FOR ADMISSION
Unspecified displaced fracture of second cervical vertebra, initial encounter for closed fracture

## 2022-06-22 NOTE — DIETITIAN INITIAL EVALUATION ADULT - CONTINUE CURRENT NUTRITION CARE PLAN
Spoke with pt scheduled fo;;ow up and CT . Details confirmed with pt.  
Nutrition Education Provided/yes

## 2022-06-22 NOTE — DIETITIAN INITIAL EVALUATION ADULT - OTHER INFO
Initial Nutrition Assessment   81yr Old Male   Denies Food Allergy/Intolerance  Tolerates Diet Well - No Chewing/Swallowing Complications (Per Patient)  No Pressure Ulcers (as Per Nursing Flow Sheets)  No Edema Noted (as Per Nursing Flow Sheets)  No Recent Nausea/Vomiting/Diarrhea & Some Recent Constipation (as Per Patient)

## 2022-06-22 NOTE — DIETITIAN INITIAL EVALUATION ADULT - PERTINENT LABORATORY DATA
06-22    137  |  103  |  20  ----------------------------<  103<H>  4.1   |  30  |  0.82    Ca    9.6      22 Jun 2022 05:35    TPro  6.2  /  Alb  3.3  /  TBili  0.8  /  DBili  x   /  AST  89<H>  /  ALT  88<H>  /  AlkPhos  53  06-22

## 2022-06-22 NOTE — DIETITIAN INITIAL EVALUATION ADULT - NS FNS DIET ORDER
on Regular Diet (IDDSI Level 7) w/ Thin Liquids (IDDSI Level 0)   Education Provided on Need for Increased Fluids/Fiber & Proper Nutrition

## 2022-06-22 NOTE — DIETITIAN INITIAL EVALUATION ADULT - EDUCATION DIETARY MODIFICATIONS
Education Provided on Need for Increased Fluids/Fiber & Proper Nutrition/(2) meets goals/outcomes/verbalization

## 2022-06-22 NOTE — DIETITIAN INITIAL EVALUATION ADULT - ADD RECOMMEND
1) Monitor Weights, Intake, Tolerance, Skin & Labwork  2) Education Provided on Need for Increased Fluids/Fiber & Proper Nutrition   3) Continue Nutrition Plan of Care

## 2022-06-22 NOTE — DIETITIAN INITIAL EVALUATION ADULT - PERTINENT MEDS FT
MEDICATIONS  (STANDING):  enoxaparin Injectable 40 milliGRAM(s) SubCutaneous every 24 hours  lidocaine   4% Patch 1 Patch Transdermal <User Schedule>  melatonin 3 milliGRAM(s) Oral at bedtime  metoprolol succinate ER 25 milliGRAM(s) Oral daily  pantoprazole    Tablet 40 milliGRAM(s) Oral before breakfast  polyethylene glycol 3350 17 Gram(s) Oral daily    MEDICATIONS  (PRN):  acetaminophen     Tablet .. 650 milliGRAM(s) Oral every 6 hours PRN Temp greater or equal to 38C (100.4F), Mild Pain (1 - 3)  senna 2 Tablet(s) Oral at bedtime PRN Constipation

## 2022-06-22 NOTE — CONSULT NOTE ADULT - ASSESSMENT
80 yo RHD Male with PMhx of Afib on Warfarin, HTN, right rotator cuff tear presented to [facility] 6/15/22 after he hit the back of his head falling off a chair. Found to have C2 fracture and transferred to [facility], non surgical management. Hospital course c/b RUE weakness and bilateral shoulder pain. Found to have right rotator cuff tear. MRI R brachial plexus unremarkable. Plan for outpatient EMG. C Maintain [place] J collar at all times. Admitted for multidisciplinary rehab program 6/21/22.    #C2 Fracture  - C Collar for OOB activity- clarified with patient's neurosurgery team. May remove C collar while in bed or for showers when supervised, Continue C collar for 6-8 weeks until seen by neurosurgeon.  - Outpatient follow up with Dr. So ([phone], press 9)  - Precautions: Cervical spine, falls, aspiration    #Afib  - metoprolol succinate ER 25 mg daily  - Resume Coumadin today, c/w Lovenox- no bridging needed  - At home patient takes  5mg 5 days/week and 2.5mg 2 days per week.   - Will give 5mg tonight, monitor INR in AM    #Right hand weakness  - Will need UE EMG in 3-4 weeks  - MRI of brachial plexus on 6/17 unremarkable    #Right rotator cuff tear  #Bilateral shoulder pain  - Lidocaine patches to both shoulders  - Cold/warm compresses PRN    #Migraines  - Pt takes Immitrex 25mg daily as needed, may repeat after 2 hrs if headache not aborted  - Hold medication, order PRN if needed    DVT prophylaxis:Coumadin tonight, INR 1.22  Thank you for pleasure of consult  Will follow course with you

## 2022-06-23 LAB
ALBUMIN SERPL ELPH-MCNC: 3.1 G/DL — LOW (ref 3.3–5)
ALP SERPL-CCNC: 54 U/L — SIGNIFICANT CHANGE UP (ref 40–120)
ALT FLD-CCNC: 118 U/L — HIGH (ref 10–45)
ANION GAP SERPL CALC-SCNC: 7 MMOL/L — SIGNIFICANT CHANGE UP (ref 5–17)
AST SERPL-CCNC: 112 U/L — HIGH (ref 10–40)
BASOPHILS # BLD AUTO: 0.06 K/UL — SIGNIFICANT CHANGE UP (ref 0–0.2)
BASOPHILS NFR BLD AUTO: 1.1 % — SIGNIFICANT CHANGE UP (ref 0–2)
BILIRUB SERPL-MCNC: 0.7 MG/DL — SIGNIFICANT CHANGE UP (ref 0.2–1.2)
BUN SERPL-MCNC: 21 MG/DL — SIGNIFICANT CHANGE UP (ref 7–23)
CALCIUM SERPL-MCNC: 9.9 MG/DL — SIGNIFICANT CHANGE UP (ref 8.4–10.5)
CHLORIDE SERPL-SCNC: 101 MMOL/L — SIGNIFICANT CHANGE UP (ref 96–108)
CO2 SERPL-SCNC: 24 MMOL/L — SIGNIFICANT CHANGE UP (ref 22–31)
CREAT SERPL-MCNC: 0.94 MG/DL — SIGNIFICANT CHANGE UP (ref 0.5–1.3)
EGFR: 83 ML/MIN/1.73M2 — SIGNIFICANT CHANGE UP
EOSINOPHIL # BLD AUTO: 0.06 K/UL — SIGNIFICANT CHANGE UP (ref 0–0.5)
EOSINOPHIL NFR BLD AUTO: 1.1 % — SIGNIFICANT CHANGE UP (ref 0–6)
GLUCOSE SERPL-MCNC: 97 MG/DL — SIGNIFICANT CHANGE UP (ref 70–99)
HCT VFR BLD CALC: 44.2 % — SIGNIFICANT CHANGE UP (ref 39–50)
HGB BLD-MCNC: 14.8 G/DL — SIGNIFICANT CHANGE UP (ref 13–17)
IMM GRANULOCYTES NFR BLD AUTO: 0.6 % — SIGNIFICANT CHANGE UP (ref 0–1.5)
INR BLD: 1.15 RATIO — SIGNIFICANT CHANGE UP (ref 0.88–1.16)
LYMPHOCYTES # BLD AUTO: 1.39 K/UL — SIGNIFICANT CHANGE UP (ref 1–3.3)
LYMPHOCYTES # BLD AUTO: 26.2 % — SIGNIFICANT CHANGE UP (ref 13–44)
MCHC RBC-ENTMCNC: 31.4 PG — SIGNIFICANT CHANGE UP (ref 27–34)
MCHC RBC-ENTMCNC: 33.5 GM/DL — SIGNIFICANT CHANGE UP (ref 32–36)
MCV RBC AUTO: 93.6 FL — SIGNIFICANT CHANGE UP (ref 80–100)
MONOCYTES # BLD AUTO: 0.44 K/UL — SIGNIFICANT CHANGE UP (ref 0–0.9)
MONOCYTES NFR BLD AUTO: 8.3 % — SIGNIFICANT CHANGE UP (ref 2–14)
NEUTROPHILS # BLD AUTO: 3.33 K/UL — SIGNIFICANT CHANGE UP (ref 1.8–7.4)
NEUTROPHILS NFR BLD AUTO: 62.7 % — SIGNIFICANT CHANGE UP (ref 43–77)
NRBC # BLD: 0 /100 WBCS — SIGNIFICANT CHANGE UP (ref 0–0)
PLATELET # BLD AUTO: 127 K/UL — LOW (ref 150–400)
POTASSIUM SERPL-MCNC: 4.3 MMOL/L — SIGNIFICANT CHANGE UP (ref 3.5–5.3)
POTASSIUM SERPL-SCNC: 4.3 MMOL/L — SIGNIFICANT CHANGE UP (ref 3.5–5.3)
PROT SERPL-MCNC: 6.5 G/DL — SIGNIFICANT CHANGE UP (ref 6–8.3)
PROTHROM AB SERPL-ACNC: 13.4 SEC — SIGNIFICANT CHANGE UP (ref 10.5–13.4)
RBC # BLD: 4.72 M/UL — SIGNIFICANT CHANGE UP (ref 4.2–5.8)
RBC # FLD: 12.8 % — SIGNIFICANT CHANGE UP (ref 10.3–14.5)
SODIUM SERPL-SCNC: 132 MMOL/L — LOW (ref 135–145)
WBC # BLD: 5.31 K/UL — SIGNIFICANT CHANGE UP (ref 3.8–10.5)
WBC # FLD AUTO: 5.31 K/UL — SIGNIFICANT CHANGE UP (ref 3.8–10.5)

## 2022-06-23 PROCEDURE — 99233 SBSQ HOSP IP/OBS HIGH 50: CPT

## 2022-06-23 PROCEDURE — 99232 SBSQ HOSP IP/OBS MODERATE 35: CPT

## 2022-06-23 RX ORDER — WARFARIN SODIUM 2.5 MG/1
5 TABLET ORAL ONCE
Refills: 0 | Status: COMPLETED | OUTPATIENT
Start: 2022-06-23 | End: 2022-06-23

## 2022-06-23 RX ADMIN — Medication 25 MILLIGRAM(S): at 05:24

## 2022-06-23 RX ADMIN — WARFARIN SODIUM 5 MILLIGRAM(S): 2.5 TABLET ORAL at 21:43

## 2022-06-23 RX ADMIN — Medication 650 MILLIGRAM(S): at 07:34

## 2022-06-23 RX ADMIN — Medication 3 MILLIGRAM(S): at 21:43

## 2022-06-23 RX ADMIN — Medication 650 MILLIGRAM(S): at 08:33

## 2022-06-23 RX ADMIN — PANTOPRAZOLE SODIUM 40 MILLIGRAM(S): 20 TABLET, DELAYED RELEASE ORAL at 05:24

## 2022-06-23 RX ADMIN — POLYETHYLENE GLYCOL 3350 17 GRAM(S): 17 POWDER, FOR SOLUTION ORAL at 05:25

## 2022-06-23 RX ADMIN — Medication 10 MILLIGRAM(S): at 05:39

## 2022-06-23 NOTE — CHART NOTE - NSCHARTNOTEFT_GEN_A_CORE
[facility] Interdiscplinary Plan of Care    REHABILITATION DIAGNOSIS:  Unspecified displaced fracture of second cervical vertebra, initial encounter for closed fracture      COMORBIDITIES/COMPLICATING CONDITIONS IMPACTING REHABILITATION:  HEALTH ISSUES - PROBLEM Dx:        PAST MEDICAL & SURGICAL HISTORY:  HTN (hypertension)      Atrial fibrillation      Migraine      History of bilateral inguinal hernia repair      History of umbilical hernia repair      Status post appendectomy          Based upon consideration of the patient's impairments, functional status, complicating conditions and any other contributing factors and after information garnered from the assessments of all therapy disciplines involved in treating the patient and other pertinent clinicians:    INTERDISCIPLINARY REHABILITATION INTERVENTIONS:    [ X  ] Transfer Training  [ X  ] Bed Mobility  [ X  ] Therapeutic Exercise  [ X ] Balance/Coordination Exercises  [ X ] Locomotion retraining  [ X  ] Stairs  [  X ] Functional Transfer Training  [   ] Bowel/Bladder program  [  X ] Pain Management  [   ] Skin/Wound Care  [   ] Visual/Perceptual Training  [   ] Therapeutic Recreation Activities  [   ] Neuromuscular Re-education  [ X  ] Activities of Daily Living  [   ] Speech Exercise  [   ] Swallowing Exercises  [   ] Vital Stim  [   ] Dietary Supplements  [   ] Calorie Count  [   ] Cognitive Exercises  [   ] Congnitive/Linguistic Treatment  [   ] Behavior Program  [   ] Neuropsych Therapy  [ X  ] Patient/Family Counseling  [ X ] Family Training  [ X  ] Community Re-entry  [   ] Orthotic Evaluation  [   ] Prosthetic Eval/Training    MEDICAL PROGNOSIS:  Good    REHAB POTENTIAL:  good  EXPECTED DAILY THERAPY:         PT: 1.5 hr       OT: 1.5 hr        ST: n/a        P&O: c collar when out of bed    EXPECTED INTENSITY OF PROGRAM:  3 hrs / Day    EXPECTED FREQUENCY OF PROGRAM: 5 Days/ Week    ESTIMATED LOS:  [  ] 5-7 Days  [  ] 7-10 Days  [ x ] 10- 14 Days  [  ] 14- 18 Days  [  ] 18- 21 Days    ESTIMATED DISPOSITION:  [  ] Home   [  ] Home with Outpatient Therapies  [ x ] Home with Home Therapies  [  ] Assisted Living  [  ] Nursing Home  [  ] Long Term Acute Care    INTERDISCIPLINARY FUNCTIONAL OUTCOMES/GOALS:         Gait/Mobility: min a with gait device       Transfers: min a       ADLs: min a       Functional Transfers: min a       Medication Management: independent       Communication: independent        Cognitive: independent       Dysphagia: independent tolerate reg diet       Bladder min a       Bowel: min a     Functional Independent Measures: 6  7 = Independent  6 = Modified Independent  5 = Supervision  4 = Minimal Assist/ Contact Guard  3 = Moderate Assistance  2 = Maximum Assistance  1 = Total Assistance  0 = Unable to assess

## 2022-06-23 NOTE — PROGRESS NOTE ADULT - ATTENDING COMMENTS
Seen with Dr Park, rehab resident    C2 fracture, stable, non surgically managed with C collar  Afib on coumadin, INR subtherapeutic, being monitored    Constipation--agreed to suppository if no improvement on current regimen    labs unremarkable,     IDT today--improving, motor strength 5/5 but impulsivity and occasional poor safety awareness/risk taking noted    Continue PT/OT--addressing gait, impulsivity, safety awareness  Will increase coumadin dose if INR still subtherapeutic tomorrow  Monitor constipation, and revise bowel regimen as needed  Continue all supportive treatments

## 2022-06-23 NOTE — PROGRESS NOTE ADULT - ASSESSMENT
82 yo RHD Male with PMhx of Afib on Warfarin, HTN, right rotator cuff tear presented to [facility] 6/15/22 after he hit the back of his head falling off a chair. Found to have C2 fracture and transferred to [facility], non surgical management. Hospital course c/b RUE weakness and bilateral shoulder pain. Found to have right rotator cuff tear. MRI R brachial plexus unremarkable. Plan for outpatient EMG. C Maintain [place] J collar at all times. Admitted for multidisciplinary rehab program 6/21/22.    #Hyponatremia  - Will monitor Na  - Encourage oral intake  - Will check BMP in AM    #Chronic stable Afib, rate controlled, subtherpeutic INR on long term anticoagulation with coumadin  - metoprolol succinate ER 25 mg daily  - Resume Coumadin today, no bridging needed  - At home patient takes  5mg 5 days/week and 2.5mg 2 days per week.   - Will give 5mg tonight, monitor INR in AM    #C2 Fracture  - C Collar for OOB activity- clarified with patient's neurosurgery team. May remove C collar while in bed or for showers when supervised, Continue C collar for 6-8 weeks until seen by neurosurgeon.  - Outpatient follow up with Dr. So ([phone], press 9)  - Precautions: Cervical spine, falls, aspiration    #Right hand weakness  - Will need UE EMG in 3-4 weeks  - MRI of brachial plexus on 6/17 unremarkable    #Right rotator cuff tear  #Bilateral shoulder pain  - Lidocaine patches to both shoulders  - Cold/warm compresses PRN    #Migraines  - Pt takes Immitrex 25mg daily as needed, may repeat after 2 hrs if headache not aborted  - Hold medication, order PRN if needed    DVT prophylaxis:Coumadin tonight, INR 1.22  Thank you for pleasure of consult  Will follow course with you

## 2022-06-23 NOTE — PROGRESS NOTE ADULT - ASSESSMENT
82 yo RHD Male with PMhx of Afib on Warfarin, HTN, right rotator cuff tear presented to [facility] 6/15/22 after he hit the back of his head falling off a chair. Found to have C2 fracture and transferred to [facility], non surgical management. Hospital course c/b RUE weakness and bilateral shoulder pain. Found to have right rotator cuff tear. MRI R brachial plexus unremarkable. Plan for outpatient EMG. C Maintain [place] J collar at all times. Admitted for multidisciplinary rehab program 6/21/22.    #C2 Fracture  - Start Comprehensive Multidisciplinary Rehab Program for Gait, ADL, Functional impairments  - PT/OT 3 hours a day 5 days a week  - C Collar for OOB activity- clarified with patient's neurosurgery team. May remove C collar while in bed or for showers when supervised, Continue C collar for 6-8 weeks until seen by neurosurgeon.  - Outpatient follow up with Dr. So ([phone], press 9)  - Precautions: Cervical spine, falls, aspiration    #Hyponatremia  - Na 132 on 6/23  - Repeat BMP on 6/24  - monitor, encourage PO intake    #Afib  - metoprolol succinate ER 25 mg daily  - INR/Coumadin daily until therapeutic  - At home patient takes  5mg 5 days/week and 2.5mg 2 days per week (on weekends)    #Right hand weakness  - Will need UE EMG in 3-4 weeks  - MRI of brachial plexus on 6/17 unremarkable    #Right rotator cuff tear  #Bilateral shoulder pain  - Lidocaine patches to both shoulders  - Cold/warm compresses PRN    #Migraines  - Pt takes Immitrex 25mg daily as needed, may repeat after 2 hrs if headache not aborted  - Hold medication, order PRN if needed    Sleep:  - Maintain quiet hours and low stim environment  - Melatonin 3mg at qhs    Pain:  - Tylenol PRN, lidocaine patches to shoulders, cold compresses PRN    GI/Bowel:  - Senna 2 tabs at qhs, Miralax daily    /Bladder:  - Monitor PVR on admission; SC for >400 cc    Diet: Regular     Skin/ Pressure Injury Prevention:  - assessment on admission-- skin tear on right shin 2cm x 2 cm, no pressure ulcers  - Incisions: none  -OOB to Chair, PT/OT     DVT prophylaxis: Coumadin  - SCDs  ------------------------------  IDT 6/23/22:  RN: Patient is continent. Can be impulsive (paces around his room)  SW: Lives with spouse in 2 homes, one of which has ground floor set up and no SALOMÓN. PTA was independent without AD.  OT: Setup - eating. CG- grooming. Min A- toilet transfers. ModA- UBD. Max A - LBD, toileting, bathing. Tub/shower transfers TBA.  Barriers: impulsive, decreased insight, decreased RUE strength and coordination. Goals: mod I for all ADLs and transfers except supervision for bath/shower transfers.  PT: Transfers with min A. Ambulates 50 ft with min A and no device. Stairs: 4 steps, 2 HR and min A. Barriers: balance, coordination. Goals: mod I  EDOD: 7/5  --------------------------------------------  Outpatient Follow up:  Neurology Clinic (for EMG)  [address] [place] [address]  [phone]  --Within 1 month    Orthopedics Clinic  [address] [place] [address]  [phone]  ---Within 6 weeks of acute hospital discharge    Trauma Physician  [address] [address]  [phone]  -- Within 7-10 days  --------------------------------------------      MEDICAL PROGNOSIS: GOOD            REHAB POTENTIAL: GOOD             ESTIMATED DISPOSITION: HOME WITH HOME CARE            ELOS: 10-14 Days   EXPECTED THERAPY:     P.T. 2hr/day       O.T. 1hr/day      S.L.P. 0 hr/day     P&O Unnecessary     EXP FREQUENCY: 5 days per 7 day period     PRESCREEN COMPARISION:   I have reviewed the prescreen information and I have found no relevant changes between the preadmission screening and my post admission evaluation     RATIONALE FOR INPATIENT ADMISSION - Patient demonstrates the following: (check all that apply)  [X] Medically appropriate for rehabilitation admission  [X] Has attainable rehab goals with an appropriate initial discharge plan  [X] Has rehabilitation potential (expected to make a significant improvement within a reasonable period of time)   [X] Requires close medical management by a rehab physician, rehab nursing care, Hospitalist and comprehensive interdisciplinary team (including PT, OT, & or SLP, Prosthetics and Orthotics)   82 yo RHD Male with PMhx of Afib on Warfarin, HTN, right rotator cuff tear presented to [facility] 6/15/22 after he hit the back of his head falling off a chair. Found to have C2 fracture and transferred to [facility], non surgical management. Hospital course c/b RUE weakness and bilateral shoulder pain. Found to have right rotator cuff tear. MRI R brachial plexus unremarkable. Plan for outpatient EMG. C Maintain [place] J collar at all times. Admitted for multidisciplinary rehab program 6/21/22.    #C2 Fracture  - Continue Comprehensive Multidisciplinary Rehab Program for Gait, ADL, Functional impairments  - PT/OT 3 hours a day 5 days a week  - C Collar for OOB activity- clarified with patient's neurosurgery team. May remove C collar while in bed or for showers when supervised, Continue C collar for 6-8 weeks until seen by neurosurgeon.  - Outpatient follow up with Dr. So ([phone], press 9)  - Precautions: Cervical spine, falls, aspiration    #Hyponatremia  - Na 132 on 6/23  - Repeat BMP on 6/24  - monitor, encourage PO intake    #Afib  - metoprolol succinate ER 25 mg daily  - INR/Coumadin daily until therapeutic  - At home patient takes  5mg 5 days/week and 2.5mg 2 days per week (on weekends)    #Right hand weakness  - Will need UE EMG in 3-4 weeks  - MRI of brachial plexus on 6/17 unremarkable    #Right rotator cuff tear  #Bilateral shoulder pain  - Lidocaine patches to both shoulders  - Cold/warm compresses PRN    #Migraines  - Pt takes Immitrex 25mg daily as needed, may repeat after 2 hrs if headache not aborted  - Hold medication, order PRN if needed    Sleep:  - Maintain quiet hours and low stim environment  - Melatonin 3mg at qhs    Pain:  - Tylenol PRN, lidocaine patches to shoulders, cold compresses PRN    GI/Bowel:  - Senna 2 tabs at qhs, Miralax daily    /Bladder:  - Monitor PVR on admission; SC for >400 cc    Diet: Regular     Skin/ Pressure Injury Prevention:  - assessment on admission-- skin tear on right shin 2cm x 2 cm, no pressure ulcers  - Incisions: none  -OOB to Chair, PT/OT     DVT prophylaxis: Coumadin  - SCDs  ------------------------------  IDT 6/23/22:  RN: Patient is continent. Can be impulsive (paces around his room)  SW: Lives with spouse in 2 homes, one of which has ground floor set up and no SALOMÓN. PTA was independent without AD.  OT: Setup - eating. CG- grooming. Min A- toilet transfers. ModA- UBD. Max A - LBD, toileting, bathing. Tub/shower transfers TBA.  Barriers: impulsive, decreased insight, decreased RUE strength and coordination. Goals: mod I for all ADLs and transfers except supervision for bath/shower transfers.  PT: Transfers with min A. Ambulates 50 ft with min A and no device. Stairs: 4 steps, 2 HR and min A. Barriers: balance, coordination. Goals: mod I  EDOD: 7/5  --------------------------------------------  Outpatient Follow up:  Neurology Clinic (for EMG)  [address] [place] [address]  [phone]  --Within 1 month    Orthopedics Clinic  [address] [place] [address]  [phone]  ---Within 6 weeks of acute hospital discharge    Trauma Physician  [address] [address]  [phone]  -- Within 7-10 days  --------------------------------------------      MEDICAL PROGNOSIS: GOOD            REHAB POTENTIAL: GOOD             ESTIMATED DISPOSITION: HOME WITH HOME CARE            ELOS: 10-14 Days   EXPECTED THERAPY:     P.T. 2hr/day       O.T. 1hr/day      S.L.P. 0 hr/day     P&O Unnecessary     EXP FREQUENCY: 5 days per 7 day period     PRESCREEN COMPARISION:   I have reviewed the prescreen information and I have found no relevant changes between the preadmission screening and my post admission evaluation     RATIONALE FOR INPATIENT ADMISSION - Patient demonstrates the following: (check all that apply)  [X] Medically appropriate for rehabilitation admission  [X] Has attainable rehab goals with an appropriate initial discharge plan  [X] Has rehabilitation potential (expected to make a significant improvement within a reasonable period of time)   [X] Requires close medical management by a rehab physician, rehab nursing care, Hospitalist and comprehensive interdisciplinary team (including PT, OT, & or SLP, Prosthetics and Orthotics)

## 2022-06-24 LAB
ANION GAP SERPL CALC-SCNC: 6 MMOL/L — SIGNIFICANT CHANGE UP (ref 5–17)
BUN SERPL-MCNC: 20 MG/DL — SIGNIFICANT CHANGE UP (ref 7–23)
CALCIUM SERPL-MCNC: 10 MG/DL — SIGNIFICANT CHANGE UP (ref 8.4–10.5)
CHLORIDE SERPL-SCNC: 102 MMOL/L — SIGNIFICANT CHANGE UP (ref 96–108)
CO2 SERPL-SCNC: 27 MMOL/L — SIGNIFICANT CHANGE UP (ref 22–31)
CREAT SERPL-MCNC: 0.94 MG/DL — SIGNIFICANT CHANGE UP (ref 0.5–1.3)
EGFR: 82 ML/MIN/1.73M2 — SIGNIFICANT CHANGE UP
GLUCOSE SERPL-MCNC: 103 MG/DL — HIGH (ref 70–99)
INR BLD: 1.27 RATIO — HIGH (ref 0.88–1.16)
POTASSIUM SERPL-MCNC: 4.4 MMOL/L — SIGNIFICANT CHANGE UP (ref 3.5–5.3)
POTASSIUM SERPL-SCNC: 4.4 MMOL/L — SIGNIFICANT CHANGE UP (ref 3.5–5.3)
PROTHROM AB SERPL-ACNC: 14.8 SEC — HIGH (ref 10.5–13.4)
SODIUM SERPL-SCNC: 135 MMOL/L — SIGNIFICANT CHANGE UP (ref 135–145)

## 2022-06-24 PROCEDURE — 99232 SBSQ HOSP IP/OBS MODERATE 35: CPT

## 2022-06-24 RX ORDER — POLYETHYLENE GLYCOL 3350 17 G/17G
17 POWDER, FOR SOLUTION ORAL DAILY
Refills: 0 | Status: DISCONTINUED | OUTPATIENT
Start: 2022-06-25 | End: 2022-06-26

## 2022-06-24 RX ORDER — WARFARIN SODIUM 2.5 MG/1
6 TABLET ORAL
Refills: 0 | Status: DISCONTINUED | OUTPATIENT
Start: 2022-06-24 | End: 2022-06-24

## 2022-06-24 RX ORDER — WARFARIN SODIUM 2.5 MG/1
5 TABLET ORAL ONCE
Refills: 0 | Status: DISCONTINUED | OUTPATIENT
Start: 2022-06-24 | End: 2022-06-24

## 2022-06-24 RX ORDER — WARFARIN SODIUM 2.5 MG/1
6 TABLET ORAL ONCE
Refills: 0 | Status: COMPLETED | OUTPATIENT
Start: 2022-06-24 | End: 2022-06-24

## 2022-06-24 RX ADMIN — Medication 25 MILLIGRAM(S): at 05:28

## 2022-06-24 RX ADMIN — Medication 3 MILLIGRAM(S): at 22:05

## 2022-06-24 RX ADMIN — PANTOPRAZOLE SODIUM 40 MILLIGRAM(S): 20 TABLET, DELAYED RELEASE ORAL at 05:28

## 2022-06-24 RX ADMIN — POLYETHYLENE GLYCOL 3350 17 GRAM(S): 17 POWDER, FOR SOLUTION ORAL at 05:28

## 2022-06-24 RX ADMIN — WARFARIN SODIUM 6 MILLIGRAM(S): 2.5 TABLET ORAL at 22:05

## 2022-06-24 NOTE — PROGRESS NOTE ADULT - ASSESSMENT
82 yo RHD Male with PMhx of Afib on Warfarin, HTN, right rotator cuff tear presented to [facility] 6/15/22 after he hit the back of his head falling off a chair. Found to have C2 fracture and transferred to [facility], non surgical management. Hospital course c/b RUE weakness and bilateral shoulder pain. Found to have right rotator cuff tear. MRI R brachial plexus unremarkable. Plan for outpatient EMG. C Maintain [place] J collar at all times. Admitted for multidisciplinary rehab program 6/21/22.    * INR monitor, coumadin dose increased from 5 to 6mg from 6/25  #C2 Fracture  - Continue Comprehensive Multidisciplinary Rehab Program for Gait, ADL, Functional impairments  - PT/OT 3 hours a day 5 days a week  - C Collar for OOB activity- clarified with patient's neurosurgery team. May remove C collar while in bed or for showers when supervised, Continue C collar for 6-8 weeks until seen by neurosurgeon.  - Outpatient follow up with Dr. So ([phone], press 9)  - Precautions: Cervical spine, falls, aspiration    #Hyponatremia  - Na 132 on 6/23  - Repeat BMP on 6/24  - monitor, encourage PO intake    #Afib  - metoprolol succinate ER 25 mg daily  - INR/Coumadin daily until therapeutic  - At home patient takes  5mg 5 days/week and 2.5mg 2 days per week (on weekends)  -Counadin dose increased from 5 to 6mg from 6/25    #Right hand weakness  - Will need UE EMG in 3-4 weeks  - MRI of brachial plexus on 6/17 unremarkable    #Right rotator cuff tear  #Bilateral shoulder pain  - Lidocaine patches to both shoulders  - Cold/warm compresses PRN    #Migraines  - Pt takes Immitrex 25mg daily as needed, may repeat after 2 hrs if headache not aborted  - Hold medication, order PRN if needed    Sleep:  - Maintain quiet hours and low stim environment  - Melatonin 3mg at qhs    Pain:  - Tylenol PRN, lidocaine patches to shoulders, cold compresses PRN    GI/Bowel:  - Senna 2 tabs at qhs, Miralax daily    /Bladder:  - Monitor PVR on admission; SC for >400 cc    Diet: Regular     Skin/ Pressure Injury Prevention:  - assessment on admission-- skin tear on right shin 2cm x 2 cm, no pressure ulcers  - Incisions: none  -OOB to Chair, PT/OT     DVT prophylaxis: Coumadin  - SCDs  ------------------------------  IDT 6/23/22:  RN: Patient is continent. Can be impulsive (paces around his room)  SW: Lives with spouse in 2 homes, one of which has ground floor set up and no SALOMÓN. PTA was independent without AD.  OT: Setup - eating. CG- grooming. Min A- toilet transfers. ModA- UBD. Max A - LBD, toileting, bathing. Tub/shower transfers TBA.  Barriers: impulsive, decreased insight, decreased RUE strength and coordination. Goals: mod I for all ADLs and transfers except supervision for bath/shower transfers.  PT: Transfers with min A. Ambulates 50 ft with min A and no device. Stairs: 4 steps, 2 HR and min A. Barriers: balance, coordination. Goals: mod I  EDOD: 7/5  --------------------------------------------  Outpatient Follow up:  Neurology Clinic (for EMG)  [address] [place] [address]  [phone]  --Within 1 month    Orthopedics Clinic  [address] [place] [address]  [phone]  ---Within 6 weeks of acute hospital discharge    Trauma Physician  [address] [address]  [phone]  -- Within 7-10 days  --------------------------------------------  Liaison with family--6/21  Spoke with wife, discussed patients functional status, clinical state and response to therapy  She was happy with same

## 2022-06-24 NOTE — PROGRESS NOTE ADULT - ASSESSMENT
80 yo RHD Male with PMhx of Afib on Warfarin, HTN, right rotator cuff tear presented to [facility] 6/15/22 after he hit the back of his head falling off a chair. Found to have C2 fracture and transferred to [facility], non surgical management. Hospital course c/b RUE weakness and bilateral shoulder pain. Found to have right rotator cuff tear. MRI R brachial plexus unremarkable. Plan for outpatient EMG. C Maintain [place] J collar at all times. Admitted for multidisciplinary rehab program 6/21/22.    #Hyponatremia, resolved  - Will monitor Na  - Encourage oral intake    #Chronic stable Afib, rate controlled, subtherpeutic INR on long term anticoagulation with coumadin  - metoprolol succinate ER 25 mg daily  - Resume Coumadin today, no bridging needed  - At home patient takes  5mg 5 days/week and 2.5mg 2 days per week.   - Will give 5mg tonight, monitor INR in AM    #C2 Fracture  - C Collar for OOB activity- clarified with patient's neurosurgery team. May remove C collar while in bed or for showers when supervised, Continue C collar for 6-8 weeks until seen by neurosurgeon.  - Outpatient follow up with Dr. So ([phone], press 9)  - Precautions: Cervical spine, falls, aspiration    #Right hand weakness  - Will need UE EMG in 3-4 weeks  - Will suggest possibly adding gabapentin to help with neuropathic pain...?  - MRI of brachial plexus on 6/17 unremarkable    #Right rotator cuff tear  #Bilateral shoulder pain  - Lidocaine patches to both shoulders  - Cold/warm compresses PRN    #Migraines  - Pt takes Immitrex 25mg daily as needed, may repeat after 2 hrs if headache not aborted  - Hold medication, order PRN if needed    DVT prophylaxis:Coumadin tonight, INR 1.27

## 2022-06-25 LAB
INR BLD: 1.54 RATIO — HIGH (ref 0.88–1.16)
PROTHROM AB SERPL-ACNC: 18 SEC — HIGH (ref 10.5–13.4)

## 2022-06-25 PROCEDURE — 99232 SBSQ HOSP IP/OBS MODERATE 35: CPT

## 2022-06-25 PROCEDURE — 99232 SBSQ HOSP IP/OBS MODERATE 35: CPT | Mod: GC

## 2022-06-25 RX ORDER — GABAPENTIN 400 MG/1
300 CAPSULE ORAL AT BEDTIME
Refills: 0 | Status: DISCONTINUED | OUTPATIENT
Start: 2022-06-25 | End: 2022-06-28

## 2022-06-25 RX ORDER — WARFARIN SODIUM 2.5 MG/1
5 TABLET ORAL ONCE
Refills: 0 | Status: COMPLETED | OUTPATIENT
Start: 2022-06-25 | End: 2022-06-25

## 2022-06-25 RX ADMIN — WARFARIN SODIUM 5 MILLIGRAM(S): 2.5 TABLET ORAL at 21:06

## 2022-06-25 RX ADMIN — GABAPENTIN 300 MILLIGRAM(S): 400 CAPSULE ORAL at 21:06

## 2022-06-25 RX ADMIN — PANTOPRAZOLE SODIUM 40 MILLIGRAM(S): 20 TABLET, DELAYED RELEASE ORAL at 05:44

## 2022-06-25 RX ADMIN — Medication 25 MILLIGRAM(S): at 05:44

## 2022-06-25 RX ADMIN — SENNA PLUS 2 TABLET(S): 8.6 TABLET ORAL at 21:06

## 2022-06-25 RX ADMIN — Medication 3 MILLIGRAM(S): at 21:06

## 2022-06-25 RX ADMIN — POLYETHYLENE GLYCOL 3350 17 GRAM(S): 17 POWDER, FOR SOLUTION ORAL at 11:19

## 2022-06-25 NOTE — PROGRESS NOTE ADULT - ASSESSMENT
82 yo RHD Male with PMhx of Afib on Warfarin, HTN, right rotator cuff tear presented to [facility] 6/15/22 after he hit the back of his head falling off a chair. Found to have C2 fracture and transferred to [facility], non surgical management. Hospital course c/b RUE weakness and bilateral shoulder pain. Found to have right rotator cuff tear. MRI R brachial plexus unremarkable. Plan for outpatient EMG. C Maintain [place] J collar at all times. Admitted for multidisciplinary rehab program 6/21/22.    Hyponatremia, resolved  - Will monitor Na  - Encourage oral intake    Chronic stable Afib, rate controlled  - metoprolol succinate ER 25 mg daily  - At home patient takes  5mg 5 days/week and 2.5mg 2 days per week.   - INR sub theraputic, repeat coumadin 5mg this PM    C2 Fracture  - C Collar for OOB activity- clarified with patient's neurosurgery team. May remove C collar while in bed or for showers when supervised, Continue C collar for 6-8 weeks until seen by neurosurgeon.  - Outpatient follow up with Dr. So ([phone], press 9)  - Precautions: Cervical spine, falls, aspiration    Right hand weakness  - Will need UE EMG in 3-4 weeks  - Will suggest possibly adding gabapentin to help with neuropathic pain...?  - MRI of brachial plexus on 6/17 unremarkable    Right rotator cuff tear/Bilateral shoulder pain  - Lidocaine patches to both shoulders  - Cold/warm compresses PRN    Migraines  - Pt takes Immitrex 25mg daily as needed, may repeat after 2 hrs if headache not aborted  - Hold medication, order PRN if needed    DVT prophylaxis: Coumadin tonight

## 2022-06-26 LAB
APTT BLD: 31.2 SEC — SIGNIFICANT CHANGE UP (ref 27.5–35.5)
INR BLD: 1.94 RATIO — HIGH (ref 0.88–1.16)
PROTHROM AB SERPL-ACNC: 22.7 SEC — HIGH (ref 10.5–13.4)

## 2022-06-26 PROCEDURE — 99232 SBSQ HOSP IP/OBS MODERATE 35: CPT

## 2022-06-26 RX ORDER — POLYETHYLENE GLYCOL 3350 17 G/17G
17 POWDER, FOR SOLUTION ORAL DAILY
Refills: 0 | Status: COMPLETED | OUTPATIENT
Start: 2022-06-26 | End: 2022-06-26

## 2022-06-26 RX ORDER — POLYETHYLENE GLYCOL 3350 17 G/17G
17 POWDER, FOR SOLUTION ORAL
Refills: 0 | Status: DISCONTINUED | OUTPATIENT
Start: 2022-06-27 | End: 2022-07-02

## 2022-06-26 RX ORDER — WARFARIN SODIUM 2.5 MG/1
5 TABLET ORAL ONCE
Refills: 0 | Status: COMPLETED | OUTPATIENT
Start: 2022-06-26 | End: 2022-06-26

## 2022-06-26 RX ADMIN — WARFARIN SODIUM 5 MILLIGRAM(S): 2.5 TABLET ORAL at 21:02

## 2022-06-26 RX ADMIN — Medication 25 MILLIGRAM(S): at 05:14

## 2022-06-26 RX ADMIN — PANTOPRAZOLE SODIUM 40 MILLIGRAM(S): 20 TABLET, DELAYED RELEASE ORAL at 05:13

## 2022-06-26 RX ADMIN — GABAPENTIN 300 MILLIGRAM(S): 400 CAPSULE ORAL at 21:02

## 2022-06-26 RX ADMIN — POLYETHYLENE GLYCOL 3350 17 GRAM(S): 17 POWDER, FOR SOLUTION ORAL at 11:51

## 2022-06-26 RX ADMIN — SENNA PLUS 2 TABLET(S): 8.6 TABLET ORAL at 21:02

## 2022-06-26 RX ADMIN — Medication 3 MILLIGRAM(S): at 21:02

## 2022-06-26 NOTE — PROGRESS NOTE ADULT - ASSESSMENT
82 yo RHD Male with PMhx of Afib on Warfarin, HTN, right rotator cuff tear presented to [facility] 6/15/22 after he hit the back of his head falling off a chair. Found to have C2 fracture and transferred to [facility], non surgical management. Hospital course c/b RUE weakness and bilateral shoulder pain. Found to have right rotator cuff tear. MRI R brachial plexus unremarkable. Plan for outpatient EMG. C Maintain [place] J collar at all times. Admitted for multidisciplinary rehab program 6/21/22.    Hyponatremia, resolved  - Will monitor Na  - Encourage oral intake    Chronic stable Afib, rate controlled  - metoprolol succinate ER 25 mg daily  - At home patient takes  5mg 5 days/week and 2.5mg 2 days per week.   - check INR, if subtheraputic would repeat 5mg tonight, if theraputic reduce to 2.5mg daily    C2 Fracture  - C Collar for OOB activity- clarified with patient's neurosurgery team. May remove C collar while in bed or for showers when supervised, Continue C collar for 6-8 weeks until seen by neurosurgeon.  - Outpatient follow up with Dr. So ([phone], press 9)  - Precautions: Cervical spine, falls, aspiration    Right hand weakness  - Will need UE EMG in 3-4 weeks  - Will suggest possibly adding gabapentin to help with neuropathic pain...?  - MRI of brachial plexus on 6/17 unremarkable    Right rotator cuff tear/Bilateral shoulder pain  - Lidocaine patches to both shoulders  - Cold/warm compresses PRN    Migraines  - Pt takes Immitrex 25mg daily as needed, may repeat after 2 hrs if headache not aborted  - Hold medication, order PRN if needed    DVT prophylaxis: Coumadin tonight

## 2022-06-27 LAB
ALBUMIN SERPL ELPH-MCNC: 3.4 G/DL — SIGNIFICANT CHANGE UP (ref 3.3–5)
ALP SERPL-CCNC: 61 U/L — SIGNIFICANT CHANGE UP (ref 40–120)
ALT FLD-CCNC: 60 U/L — HIGH (ref 10–45)
ANION GAP SERPL CALC-SCNC: 5 MMOL/L — SIGNIFICANT CHANGE UP (ref 5–17)
AST SERPL-CCNC: 29 U/L — SIGNIFICANT CHANGE UP (ref 10–40)
BASOPHILS # BLD AUTO: 0.05 K/UL — SIGNIFICANT CHANGE UP (ref 0–0.2)
BASOPHILS NFR BLD AUTO: 0.7 % — SIGNIFICANT CHANGE UP (ref 0–2)
BILIRUB SERPL-MCNC: 0.7 MG/DL — SIGNIFICANT CHANGE UP (ref 0.2–1.2)
BUN SERPL-MCNC: 18 MG/DL — SIGNIFICANT CHANGE UP (ref 7–23)
CALCIUM SERPL-MCNC: 10.1 MG/DL — SIGNIFICANT CHANGE UP (ref 8.4–10.5)
CHLORIDE SERPL-SCNC: 103 MMOL/L — SIGNIFICANT CHANGE UP (ref 96–108)
CO2 SERPL-SCNC: 30 MMOL/L — SIGNIFICANT CHANGE UP (ref 22–31)
CREAT SERPL-MCNC: 0.91 MG/DL — SIGNIFICANT CHANGE UP (ref 0.5–1.3)
EGFR: 86 ML/MIN/1.73M2 — SIGNIFICANT CHANGE UP
EOSINOPHIL # BLD AUTO: 0.12 K/UL — SIGNIFICANT CHANGE UP (ref 0–0.5)
EOSINOPHIL NFR BLD AUTO: 1.8 % — SIGNIFICANT CHANGE UP (ref 0–6)
GLUCOSE SERPL-MCNC: 95 MG/DL — SIGNIFICANT CHANGE UP (ref 70–99)
HCT VFR BLD CALC: 42.4 % — SIGNIFICANT CHANGE UP (ref 39–50)
HGB BLD-MCNC: 14.3 G/DL — SIGNIFICANT CHANGE UP (ref 13–17)
IMM GRANULOCYTES NFR BLD AUTO: 0.6 % — SIGNIFICANT CHANGE UP (ref 0–1.5)
INR BLD: 2.04 RATIO — HIGH (ref 0.88–1.16)
LYMPHOCYTES # BLD AUTO: 1.78 K/UL — SIGNIFICANT CHANGE UP (ref 1–3.3)
LYMPHOCYTES # BLD AUTO: 26.2 % — SIGNIFICANT CHANGE UP (ref 13–44)
MCHC RBC-ENTMCNC: 31.2 PG — SIGNIFICANT CHANGE UP (ref 27–34)
MCHC RBC-ENTMCNC: 33.7 GM/DL — SIGNIFICANT CHANGE UP (ref 32–36)
MCV RBC AUTO: 92.6 FL — SIGNIFICANT CHANGE UP (ref 80–100)
MONOCYTES # BLD AUTO: 0.75 K/UL — SIGNIFICANT CHANGE UP (ref 0–0.9)
MONOCYTES NFR BLD AUTO: 11 % — SIGNIFICANT CHANGE UP (ref 2–14)
NEUTROPHILS # BLD AUTO: 4.06 K/UL — SIGNIFICANT CHANGE UP (ref 1.8–7.4)
NEUTROPHILS NFR BLD AUTO: 59.7 % — SIGNIFICANT CHANGE UP (ref 43–77)
NRBC # BLD: 0 /100 WBCS — SIGNIFICANT CHANGE UP (ref 0–0)
PLATELET # BLD AUTO: 147 K/UL — LOW (ref 150–400)
POTASSIUM SERPL-MCNC: 4.6 MMOL/L — SIGNIFICANT CHANGE UP (ref 3.5–5.3)
POTASSIUM SERPL-SCNC: 4.6 MMOL/L — SIGNIFICANT CHANGE UP (ref 3.5–5.3)
PROT SERPL-MCNC: 6.7 G/DL — SIGNIFICANT CHANGE UP (ref 6–8.3)
PROTHROM AB SERPL-ACNC: 23.8 SEC — HIGH (ref 10.5–13.4)
RBC # BLD: 4.58 M/UL — SIGNIFICANT CHANGE UP (ref 4.2–5.8)
RBC # FLD: 12.5 % — SIGNIFICANT CHANGE UP (ref 10.3–14.5)
SODIUM SERPL-SCNC: 138 MMOL/L — SIGNIFICANT CHANGE UP (ref 135–145)
WBC # BLD: 6.8 K/UL — SIGNIFICANT CHANGE UP (ref 3.8–10.5)
WBC # FLD AUTO: 6.8 K/UL — SIGNIFICANT CHANGE UP (ref 3.8–10.5)

## 2022-06-27 PROCEDURE — 99232 SBSQ HOSP IP/OBS MODERATE 35: CPT

## 2022-06-27 PROCEDURE — 99233 SBSQ HOSP IP/OBS HIGH 50: CPT

## 2022-06-27 RX ORDER — WARFARIN SODIUM 2.5 MG/1
5 TABLET ORAL
Refills: 0 | Status: DISCONTINUED | OUTPATIENT
Start: 2022-06-27 | End: 2022-06-28

## 2022-06-27 RX ORDER — WARFARIN SODIUM 2.5 MG/1
6 TABLET ORAL
Refills: 0 | Status: DISCONTINUED | OUTPATIENT
Start: 2022-06-27 | End: 2022-06-27

## 2022-06-27 RX ADMIN — PANTOPRAZOLE SODIUM 40 MILLIGRAM(S): 20 TABLET, DELAYED RELEASE ORAL at 05:11

## 2022-06-27 RX ADMIN — Medication 25 MILLIGRAM(S): at 05:11

## 2022-06-27 RX ADMIN — SENNA PLUS 2 TABLET(S): 8.6 TABLET ORAL at 21:18

## 2022-06-27 RX ADMIN — POLYETHYLENE GLYCOL 3350 17 GRAM(S): 17 POWDER, FOR SOLUTION ORAL at 08:05

## 2022-06-27 RX ADMIN — GABAPENTIN 300 MILLIGRAM(S): 400 CAPSULE ORAL at 21:18

## 2022-06-27 RX ADMIN — Medication 3 MILLIGRAM(S): at 21:18

## 2022-06-27 RX ADMIN — WARFARIN SODIUM 5 MILLIGRAM(S): 2.5 TABLET ORAL at 21:43

## 2022-06-27 NOTE — CHART NOTE - NSCHARTNOTEFT_GEN_A_CORE
Patient refused coumadin 6mg. Discussed w/ patient at bedside. Requesting 5mg instead of 6mg.     Reviewed INR. Received 5mg doses for 48hrs prior and now is therapeutic. OK to continue 5mg coumadin for tonight and tomorrow with repeat INR morning after.

## 2022-06-27 NOTE — PROGRESS NOTE ADULT - ASSESSMENT
80 yo RHD Male with PMhx of Afib on Warfarin, HTN, right rotator cuff tear presented to [facility] 6/15/22 after he hit the back of his head falling off a chair. Found to have C2 fracture and transferred to [facility], non surgical management. Hospital course c/b RUE weakness and bilateral shoulder pain. Found to have right rotator cuff tear. MRI R brachial plexus unremarkable. Plan for outpatient EMG. C Maintain [place] J collar at all times. Admitted for multidisciplinary rehab program 6/21/22.    * INR 2.04 therapeutic, c/w warfarin 6mg daily  * DC date to be revised to 7/1,  * SW f/u on home therapy plans    #C2 Fracture  - Continue Comprehensive Multidisciplinary Rehab Program for Gait, ADL, Functional impairments  - PT/OT 3 hours a day 5 days a week  - C Collar for OOB activity- clarified with patient's neurosurgery team. May remove C collar while in bed or for showers when supervised, Continue C collar for 6-8 weeks until seen by neurosurgeon.  - Outpatient follow up with Dr. So ([phone], press 9)  - Precautions: Cervical spine, falls, aspiration    # Hyponatremia  - Na 138 on 6/2  - monitor, encourage PO intake    #Afib  - metoprolol succinate ER 25 mg daily  - INR therapeutic, then c/w alt daily INR  - At home patient takes  5mg 5 days/week and 2.5mg 2 days per week (on weekends)  -Coumadin dose increased from 5 to 6mg from 6/25 will continue same    #Right hand weakness  - Will need UE EMG in 3-4 weeks  - MRI of brachial plexus on 6/17 unremarkable    #Right rotator cuff tear  #Bilateral shoulder pain  - Lidocaine patches to both shoulders  - Cold/warm compresses PRN    #Migraines  - Pt takes Immitrex 25mg daily as needed, may repeat after 2 hrs if headache not aborted  - Hold medication, order PRN if needed    Sleep:  - Maintain quiet hours and low stim environment  - Melatonin 3mg at qhs    Pain:  - Tylenol PRN, lidocaine patches to shoulders, cold compresses PRN    GI/Bowel:  - Senna 2 tabs at qhs, Miralax daily    /Bladder:  - Monitor PVR on admission; SC for >400 cc    Diet: Regular     Skin/ Pressure Injury Prevention:  - assessment on admission-- skin tear on right shin 2cm x 2 cm, no pressure ulcers  - Incisions: none  -OOB to Chair, PT/OT     DVT prophylaxis: Coumadin  - SCDs  ------------------------------  IDT 6/23/22:  RN: Patient is continent. Can be impulsive (paces around his room)  SW: Lives with spouse in 2 homes, one of which has ground floor set up and no SALOMÓN. PTA was independent without AD.  OT: Setup - eating. CG- grooming. Min A- toilet transfers. ModA- UBD. Max A - LBD, toileting, bathing. Tub/shower transfers TBA.  Barriers: impulsive, decreased insight, decreased RUE strength and coordination. Goals: mod I for all ADLs and transfers except supervision for bath/shower transfers.  PT: Transfers with min A. Ambulates 50 ft with min A and no device. Stairs: 4 steps, 2 HR and min A. Barriers: balance, coordination. Goals: mod I  EDOD: 7/5  --------------------------------------------  Outpatient Follow up:  Neurology Clinic (for EMG)  [address] [place] [address]  [phone]  --Within 1 month    Orthopedics Clinic  [address] [place] [address]  [phone]  ---Within 6 weeks of acute hospital discharge    Trauma Physician  [address] [address]  [phone]  -- Within 7-10 days  --------------------------------------------  Liaison with family--6/21  Spoke with wife, discussed patients functional status, clinical state and response to therapy  She was happy with same  6/27--Discussed therapy progress and d/c planning with patient and wife, wife participated on phone

## 2022-06-27 NOTE — PROGRESS NOTE ADULT - ASSESSMENT
80 yo RHD Male with PMhx of Afib on Warfarin, HTN, right rotator cuff tear presented to [facility] 6/15/22 after he hit the back of his head falling off a chair. Found to have C2 fracture and transferred to [facility], non surgical management. Hospital course c/b RUE weakness and bilateral shoulder pain. Found to have right rotator cuff tear. MRI R brachial plexus unremarkable. Plan for outpatient EMG. C Maintain [place] J collar at all times. Admitted for multidisciplinary rehab program 6/21/22.    Hyponatremia, resolved  - Will monitor Na  - Encourage oral intake    Chronic stable Afib, rate controlled  - metoprolol succinate ER 25 mg daily  - At home patient takes  5mg 5 days/week and 2.5mg 2 days per week.   - check INR,  6mg tonight, if theraputic reduce to 2.5mg daily    C2 Fracture  - C Collar for OOB activity- clarified with patient's neurosurgery team. May remove C collar while in bed or for showers when supervised, Continue C collar for 6-8 weeks until seen by neurosurgeon.  - Outpatient follow up with Dr. So ([phone], press 9)  - Precautions: Cervical spine, falls, aspiration    Right hand weakness  - Will need UE EMG in 3-4 weeks  - Will suggest possibly adding gabapentin to help with neuropathic pain...?  - MRI of brachial plexus on 6/17 unremarkable    Right rotator cuff tear/Bilateral shoulder pain  - Lidocaine patches to both shoulders  - Cold/warm compresses PRN    Migraines  - Pt takes Immitrex 25mg daily as needed, may repeat after 2 hrs if headache not aborted  - Hold medication, order PRN if needed    DVT prophylaxis: Coumadin tonight

## 2022-06-28 PROBLEM — I10 ESSENTIAL (PRIMARY) HYPERTENSION: Chronic | Status: ACTIVE | Noted: 2022-06-21

## 2022-06-28 PROBLEM — G43.909 MIGRAINE, UNSPECIFIED, NOT INTRACTABLE, WITHOUT STATUS MIGRAINOSUS: Chronic | Status: ACTIVE | Noted: 2022-06-21

## 2022-06-28 PROBLEM — I48.91 UNSPECIFIED ATRIAL FIBRILLATION: Chronic | Status: ACTIVE | Noted: 2022-06-21

## 2022-06-28 LAB — SARS-COV-2 RNA SPEC QL NAA+PROBE: SIGNIFICANT CHANGE UP

## 2022-06-28 PROCEDURE — 99233 SBSQ HOSP IP/OBS HIGH 50: CPT

## 2022-06-28 PROCEDURE — 99232 SBSQ HOSP IP/OBS MODERATE 35: CPT

## 2022-06-28 RX ORDER — WARFARIN SODIUM 2.5 MG/1
2.5 TABLET ORAL ONCE
Refills: 0 | Status: COMPLETED | OUTPATIENT
Start: 2022-06-28 | End: 2022-06-28

## 2022-06-28 RX ORDER — GABAPENTIN 400 MG/1
100 CAPSULE ORAL AT BEDTIME
Refills: 0 | Status: DISCONTINUED | OUTPATIENT
Start: 2022-06-28 | End: 2022-07-02

## 2022-06-28 RX ADMIN — POLYETHYLENE GLYCOL 3350 17 GRAM(S): 17 POWDER, FOR SOLUTION ORAL at 07:24

## 2022-06-28 RX ADMIN — WARFARIN SODIUM 2.5 MILLIGRAM(S): 2.5 TABLET ORAL at 22:19

## 2022-06-28 RX ADMIN — Medication 25 MILLIGRAM(S): at 05:13

## 2022-06-28 RX ADMIN — PANTOPRAZOLE SODIUM 40 MILLIGRAM(S): 20 TABLET, DELAYED RELEASE ORAL at 05:13

## 2022-06-28 RX ADMIN — GABAPENTIN 100 MILLIGRAM(S): 400 CAPSULE ORAL at 22:17

## 2022-06-28 RX ADMIN — SENNA PLUS 2 TABLET(S): 8.6 TABLET ORAL at 22:17

## 2022-06-28 NOTE — PROGRESS NOTE ADULT - ASSESSMENT
82 yo RHD Male with PMhx of Afib on Warfarin, HTN, right rotator cuff tear presented to [facility] 6/15/22 after he hit the back of his head falling off a chair. Found to have C2 fracture and transferred to [facility], non surgical management. Hospital course c/b RUE weakness and bilateral shoulder pain. Found to have right rotator cuff tear. MRI R brachial plexus unremarkable. Plan for outpatient EMG. C Maintain [place] J collar at all times. Admitted for multidisciplinary rehab program 6/21/22.    * INR 2.04 therapeutic, c/w home dose  * DC date to be revised to 7/1,  *PT/OT scripts for home care * Reduce gabapentin to 100mg qhs     #C2 Fracture  - Continue Comprehensive Multidisciplinary Rehab Program for Gait, ADL, Functional impairments  - PT/OT 3 hours a day 5 days a week  - C Collar for OOB activity- clarified with patient's neurosurgery team. May remove C collar while in bed or for showers when supervised, Continue C collar for 6-8 weeks until seen by neurosurgeon.  - Outpatient follow up with Dr. So ([phone], press 9)  - Precautions: Cervical spine, falls, aspiration    # Hyponatremia, resolved  - monitor, encourage PO intake    #Afib  - metoprolol succinate ER 25 mg daily  - INR therapeutic, then c/w alt daily INR  - At home patient takes  5mg 5 days/week and 2.5mg 2 days per week (on weekends)  -Coumadin dose change to home regimen 6/28    #Right hand weakness  - Will need UE EMG in 3-4 weeks  - MRI of brachial plexus on 6/17 unremarkable  --reduce gabapentin to 100mg qhs due to drowziness    #Right rotator cuff tear  #Bilateral shoulder pain  - Lidocaine patches to both shoulders  - Cold/warm compresses PRN    #Migraines  - Pt takes Immitrex 25mg daily as needed, may repeat after 2 hrs if headache not aborted  - Hold medication, order PRN if needed    Sleep:  - Maintain quiet hours and low stim environment  - Melatonin 3mg at qhs    Pain:  - Tylenol PRN, lidocaine patches to shoulders, cold compresses PRN    GI/Bowel:  - Senna 2 tabs at qhs, Miralax daily    /Bladder:  - Monitor PVR on admission; SC for >400 cc    Diet: Regular     Skin/ Pressure Injury Prevention:  - assessment on admission-- skin tear on right shin 2cm x 2 cm, no pressure ulcers  - Incisions: none  -OOB to Chair, PT/OT     DVT prophylaxis: Coumadin  - SCDs  ------------------------------  IDT 6/23/22:  RN: Patient is continent. Can be impulsive (paces around his room)  SW: Lives with spouse in 2 homes, one of which has ground floor set up and no SALOMÓN. PTA was independent without AD.  OT: Setup - eating. CG- grooming. Min A- toilet transfers. ModA- UBD. Max A - LBD, toileting, bathing. Tub/shower transfers TBA.  Barriers: impulsive, decreased insight, decreased RUE strength and coordination. Goals: mod I for all ADLs and transfers except supervision for bath/shower transfers.  PT: Transfers with min A. Ambulates 50 ft with min A and no device. Stairs: 4 steps, 2 HR and min A. Barriers: balance, coordination. Goals: mod I  EDOD: 7/5    IDT--6/28  Has good family support  Function--ambulatoing 100ft with RW CGA negotiating 12 steps with I hand rail  Min A for body dressing  Barriers--poor endurance  Goal--mod independence  est dc 7/7  --------------------------------------------  Outpatient Follow up:  Neurology Clinic (for EMG)  [address] [id number] [phone]  --Within 1 month    Orthopedics Clinic  [address] [place] [address]  [phone]  ---Within 6 weeks of acute hospital discharge    Trauma Physician  [address] [place] [address]  [phone]  -- Within 7-10 days  --------------------------------------------  Liaison with family--6/21  Spoke with wife, discussed patients functional status, clinical state and response to therapy  She was happy with same  6/27--Discussed therapy progress and d/c planning with patient and wife, wife participated on phone

## 2022-06-28 NOTE — PROGRESS NOTE ADULT - ASSESSMENT
80 yo RHD Male with PMhx of Afib on Warfarin, HTN, right rotator cuff tear presented to [facility] 6/15/22 after he hit the back of his head falling off a chair. Found to have C2 fracture and transferred to [facility], non surgical management. Hospital course c/b RUE weakness and bilateral shoulder pain. Found to have right rotator cuff tear. MRI R brachial plexus unremarkable. Plan for outpatient EMG. C Maintain [place] J collar at all times. Admitted for multidisciplinary rehab program 6/21/22.    Hyponatremia, resolved  - Will monitor Na  - Encourage oral intake    Chronic stable Afib, rate controlled  - metoprolol succinate ER 25 mg daily  - At home patient takes  5mg 5 days/week and 2.5mg 2 days per week.   - check INR, 2.5mg tonight    C2 Fracture  - C Collar for OOB activity- clarified with patient's neurosurgery team. May remove C collar while in bed or for showers when supervised, Continue C collar for 6-8 weeks until seen by neurosurgeon.  - Outpatient follow up with Dr. So ([phone], press 9)  - Precautions: Cervical spine, falls, aspiration    Right hand weakness  - Will need UE EMG in 3-4 weeks  - Patient reports feeling drowzy with gabapentin 300mg at night; will suggest to decrease dose...???  - MRI of brachial plexus on 6/17 unremarkable    Right rotator cuff tear/Bilateral shoulder pain  - Lidocaine patches to both shoulders  - Cold/warm compresses PRN    Migraines  - Pt takes Immitrex 25mg daily as needed, may repeat after 2 hrs if headache not aborted  - Hold medication, order PRN if needed    DVT prophylaxis: Coumadin tonight

## 2022-06-29 PROCEDURE — 99232 SBSQ HOSP IP/OBS MODERATE 35: CPT

## 2022-06-29 RX ORDER — WARFARIN SODIUM 2.5 MG/1
2.5 TABLET ORAL ONCE
Refills: 0 | Status: COMPLETED | OUTPATIENT
Start: 2022-06-29 | End: 2022-06-29

## 2022-06-29 RX ADMIN — PANTOPRAZOLE SODIUM 40 MILLIGRAM(S): 20 TABLET, DELAYED RELEASE ORAL at 05:08

## 2022-06-29 RX ADMIN — SENNA PLUS 2 TABLET(S): 8.6 TABLET ORAL at 22:16

## 2022-06-29 RX ADMIN — WARFARIN SODIUM 2.5 MILLIGRAM(S): 2.5 TABLET ORAL at 22:15

## 2022-06-29 RX ADMIN — GABAPENTIN 100 MILLIGRAM(S): 400 CAPSULE ORAL at 22:15

## 2022-06-29 RX ADMIN — Medication 25 MILLIGRAM(S): at 05:08

## 2022-06-29 RX ADMIN — POLYETHYLENE GLYCOL 3350 17 GRAM(S): 17 POWDER, FOR SOLUTION ORAL at 07:59

## 2022-06-29 NOTE — PROGRESS NOTE ADULT - ASSESSMENT
82 yo RHD Male with PMhx of Afib on Warfarin, HTN, right rotator cuff tear presented to [facility] 6/15/22 after he hit the back of his head falling off a chair. Found to have C2 fracture and transferred to [facility], non surgical management. Hospital course c/b RUE weakness and bilateral shoulder pain. Found to have right rotator cuff tear. MRI R brachial plexus unremarkable. Plan for outpatient EMG. C Maintain [place] J collar at all times. Admitted for multidisciplinary rehab program 6/21/22.    Hyponatremia, resolved  - Will monitor Na  - Encourage oral intake    Chronic stable Afib, rate controlled  - metoprolol succinate ER 25 mg daily  - At home patient takes  5mg 5 days/week and 2.5mg 2 days per week.   - check INR, 2.5mg tonight    C2 Fracture  - C Collar for OOB activity- clarified with patient's neurosurgery team. May remove C collar while in bed or for showers when supervised, Continue C collar for 6-8 weeks until seen by neurosurgeon.  - Outpatient follow up with Dr. So ([phone], press 9)  - Precautions: Cervical spine, falls, aspiration    Right hand weakness  - Will need UE EMG in 3-4 weeks  - Patient reports feeling drowzy with gabapentin 300mg at night; decreased to 100mg qhs  - MRI of brachial plexus on 6/17 unremarkable    Right rotator cuff tear/Bilateral shoulder pain  - Lidocaine patches to both shoulders  - Cold/warm compresses PRN    Migraines  - Pt takes Immitrex 25mg daily as needed, may repeat after 2 hrs if headache not aborted  - Hold medication, order PRN if needed    DVT prophylaxis: Coumadin tonight

## 2022-06-29 NOTE — CHART NOTE - NSCHARTNOTEFT_GEN_A_CORE
Nutrition Follow Up Note  Hospital Course   (Per Electronic Medical Record)    Source:  Patient [X]  Medical Record [X]      Diet:   Regular Diet (IDDSI Level 7) w/ Thin Liquids (IDDSI Level 0)  Tolerates Diet Consistency Well  No Chewing/Swallowing Difficulties  No Recent Nausea, Vomiting, Diarrhea or Constipation (as Per Patient)  Consumes % of Meals (as Per Documentation) - States Good PO Intake/Appetite   Education Provided on Proper Nutrition   Obtained Food Preferences from Patient    Enteral/Parenteral Nutrition: Not Applicable    Current Weight: 181.2lb on 6/21  Obtain New Weight  Obtain Weights Weekly     Pertinent Medications: MEDICATIONS  (STANDING):  gabapentin 100 milliGRAM(s) Oral at bedtime  lidocaine   4% Patch 1 Patch Transdermal <User Schedule>  melatonin 3 milliGRAM(s) Oral at bedtime  metoprolol succinate ER 25 milliGRAM(s) Oral daily  pantoprazole    Tablet 40 milliGRAM(s) Oral before breakfast  polyethylene glycol 3350 17 Gram(s) Oral <User Schedule>  senna 2 Tablet(s) Oral at bedtime  warfarin 2.5 milliGRAM(s) Oral once    MEDICATIONS  (PRN):  acetaminophen     Tablet .. 650 milliGRAM(s) Oral every 6 hours PRN Temp greater or equal to 38C (100.4F), Mild Pain (1 - 3)  bisacodyl Suppository 10 milliGRAM(s) Rectal daily PRN Constipation    Pertinent Labs:  06-27 Na138 mmol/L Glu 95 mg/dL K+ 4.6 mmol/L Cr  0.91 mg/dL BUN 18 mg/dL 06-27 Alb 3.4 g/dL    Skin: No Pressure Ulcers     Edema: None Noted (as Per Documentation)     Last Bowel Movement: on 6/28    Estimated Needs:   [X] No Change Since Previous Assessment    Previous Nutrition Diagnosis:   No Active Nutrition Dx @ This Present Time    Nutrition Diagnosis is [X] Ongoing - [X] Not Applicable     New Nutrition Diagnosis: [X] Not Applicable    Interventions:   1. Education Provided on Proper Nutrition   2. Recommend Continue Nutrition Plan of Care     Monitoring & Evaluation:   [X] Weights   [X] PO Intake   [X] Skin Integrity   [X] Follow Up (Per Protocol)  [X] Tolerance to Diet Prescription   [X] Other: Labs     Registered Dietitian/Nutritionist Remains Available.  Sharif Mace RDN    Pager #[phone]  Phone# [phone]

## 2022-06-29 NOTE — PROGRESS NOTE ADULT - ASSESSMENT
80 yo RHD Male with PMhx of Afib on Warfarin, HTN, right rotator cuff tear presented to [facility] 6/15/22 after he hit the back of his head falling off a chair. Found to have C2 fracture and transferred to [facility], non surgical management. Hospital course c/b RUE weakness and bilateral shoulder pain. Found to have right rotator cuff tear. MRI R brachial plexus unremarkable. Plan for outpatient EMG. C Maintain [place] J collar at all times. Admitted for multidisciplinary rehab program 6/21/22.    * INR 2.04 therapeutic, c/w home dose  * DC date to be revised to 7/2,  *PT/OT scripts for home care * Drowziness resolved     #C2 Fracture  - Continue Comprehensive Multidisciplinary Rehab Program for Gait, ADL, Functional impairments  - PT/OT 3 hours a day 5 days a week  - C Collar for OOB activity- clarified with patient's neurosurgery team. May remove C collar while in bed or for showers when supervised, Continue C collar for 6-8 weeks until seen by neurosurgeon.  - Outpatient follow up with Dr. So ([phone], press 9)  - Precautions: Cervical spine, falls, aspiration    # Hyponatremia, resolved  - monitor, encourage PO intake    #Afib  - metoprolol succinate ER 25 mg daily  - INR therapeutic, then c/w alt daily INR  - At home patient takes  5mg 5 days/week and 2.5mg 2 days per week (on weekends)  -Coumadin dose change to home regimen 6/28  (5mg for 5 days and 2.5mg for 2 days)    #Right hand weakness  - Will need UE EMG in 3-4 weeks  - MRI of brachial plexus on 6/17 unremarkable  --reduce gabapentin to 100mg qhs due to drowziness    #Right rotator cuff tear  #Bilateral shoulder pain  - Lidocaine patches to both shoulders  - Cold/warm compresses PRN    #Migraines  - Pt takes Immitrex 25mg daily as needed, may repeat after 2 hrs if headache not aborted  - Hold medication, order PRN if needed    Sleep:  - Maintain quiet hours and low stim environment  - Melatonin 3mg at qhs    Pain:  - Tylenol PRN, lidocaine patches to shoulders, cold compresses PRN    GI/Bowel:  - Senna 2 tabs at qhs, Miralax daily    /Bladder:  - Monitor PVR on admission; SC for >400 cc    Diet: Regular     Skin/ Pressure Injury Prevention:  - assessment on admission-- skin tear on right shin 2cm x 2 cm, no pressure ulcers  - Incisions: none  -OOB to Chair, PT/OT     DVT prophylaxis: Coumadin  - SCDs  ------------------------------  IDT 6/23/22:  RN: Patient is continent. Can be impulsive (paces around his room)  SW: Lives with spouse in 2 homes, one of which has ground floor set up and no SALOMÓN. PTA was independent without AD.  OT: Setup - eating. CG- grooming. Min A- toilet transfers. ModA- UBD. Max A - LBD, toileting, bathing. Tub/shower transfers TBA.  Barriers: impulsive, decreased insight, decreased RUE strength and coordination. Goals: mod I for all ADLs and transfers except supervision for bath/shower transfers.  PT: Transfers with min A. Ambulates 50 ft with min A and no device. Stairs: 4 steps, 2 HR and min A. Barriers: balance, coordination. Goals: mod I  EDOD: 7/5    IDT--6/28  Has good family support  Function--ambulatoing 100ft with RW CGA negotiating 12 steps with I hand rail  Min A for body dressing  Barriers--poor endurance  Goal--mod independence  est dc 7/2 to home  --------------------------------------------  Outpatient Follow up:  Neurology Clinic (for EMG)  [address] [place] [address]  [phone]  --Within 1 month    Orthopedics Clinic  [address] [place] [address]  [phone]  ---Within 6 weeks of acute hospital discharge    Trauma Physician  [address] [address]  [phone]  -- Within 7-10 days  --------------------------------------------  Liaison with family--6/21  Spoke with wife, discussed patients functional status, clinical state and response to therapy  She was happy with same  6/27--Discussed therapy progress and d/c planning with patient and wife, wife participated on phone   eyeglasses

## 2022-06-30 ENCOUNTER — TRANSCRIPTION ENCOUNTER (OUTPATIENT)
Age: 82
End: 2022-06-30

## 2022-06-30 DIAGNOSIS — G62.9 POLYNEUROPATHY, UNSPECIFIED: ICD-10-CM

## 2022-06-30 LAB
ALBUMIN SERPL ELPH-MCNC: 3.3 G/DL — SIGNIFICANT CHANGE UP (ref 3.3–5)
ALP SERPL-CCNC: 63 U/L — SIGNIFICANT CHANGE UP (ref 40–120)
ALT FLD-CCNC: 34 U/L — SIGNIFICANT CHANGE UP (ref 10–45)
ANION GAP SERPL CALC-SCNC: 8 MMOL/L — SIGNIFICANT CHANGE UP (ref 5–17)
AST SERPL-CCNC: 22 U/L — SIGNIFICANT CHANGE UP (ref 10–40)
BASOPHILS # BLD AUTO: 0.06 K/UL — SIGNIFICANT CHANGE UP (ref 0–0.2)
BASOPHILS NFR BLD AUTO: 1.1 % — SIGNIFICANT CHANGE UP (ref 0–2)
BILIRUB SERPL-MCNC: 0.6 MG/DL — SIGNIFICANT CHANGE UP (ref 0.2–1.2)
BUN SERPL-MCNC: 16 MG/DL — SIGNIFICANT CHANGE UP (ref 7–23)
CALCIUM SERPL-MCNC: 9.9 MG/DL — SIGNIFICANT CHANGE UP (ref 8.4–10.5)
CHLORIDE SERPL-SCNC: 106 MMOL/L — SIGNIFICANT CHANGE UP (ref 96–108)
CO2 SERPL-SCNC: 25 MMOL/L — SIGNIFICANT CHANGE UP (ref 22–31)
CREAT SERPL-MCNC: 1.01 MG/DL — SIGNIFICANT CHANGE UP (ref 0.5–1.3)
EGFR: 75 ML/MIN/1.73M2 — SIGNIFICANT CHANGE UP
EOSINOPHIL # BLD AUTO: 0.09 K/UL — SIGNIFICANT CHANGE UP (ref 0–0.5)
EOSINOPHIL NFR BLD AUTO: 1.6 % — SIGNIFICANT CHANGE UP (ref 0–6)
GLUCOSE SERPL-MCNC: 98 MG/DL — SIGNIFICANT CHANGE UP (ref 70–99)
HCT VFR BLD CALC: 41.3 % — SIGNIFICANT CHANGE UP (ref 39–50)
HGB BLD-MCNC: 14 G/DL — SIGNIFICANT CHANGE UP (ref 13–17)
IMM GRANULOCYTES NFR BLD AUTO: 0.2 % — SIGNIFICANT CHANGE UP (ref 0–1.5)
INR BLD: 2.37 RATIO — HIGH (ref 0.88–1.16)
LYMPHOCYTES # BLD AUTO: 1.45 K/UL — SIGNIFICANT CHANGE UP (ref 1–3.3)
LYMPHOCYTES # BLD AUTO: 26.5 % — SIGNIFICANT CHANGE UP (ref 13–44)
MCHC RBC-ENTMCNC: 30.6 PG — SIGNIFICANT CHANGE UP (ref 27–34)
MCHC RBC-ENTMCNC: 33.9 GM/DL — SIGNIFICANT CHANGE UP (ref 32–36)
MCV RBC AUTO: 90.2 FL — SIGNIFICANT CHANGE UP (ref 80–100)
MONOCYTES # BLD AUTO: 0.43 K/UL — SIGNIFICANT CHANGE UP (ref 0–0.9)
MONOCYTES NFR BLD AUTO: 7.9 % — SIGNIFICANT CHANGE UP (ref 2–14)
NEUTROPHILS # BLD AUTO: 3.43 K/UL — SIGNIFICANT CHANGE UP (ref 1.8–7.4)
NEUTROPHILS NFR BLD AUTO: 62.7 % — SIGNIFICANT CHANGE UP (ref 43–77)
NRBC # BLD: 0 /100 WBCS — SIGNIFICANT CHANGE UP (ref 0–0)
PLATELET # BLD AUTO: 154 K/UL — SIGNIFICANT CHANGE UP (ref 150–400)
POTASSIUM SERPL-MCNC: 4.3 MMOL/L — SIGNIFICANT CHANGE UP (ref 3.5–5.3)
POTASSIUM SERPL-SCNC: 4.3 MMOL/L — SIGNIFICANT CHANGE UP (ref 3.5–5.3)
PROT SERPL-MCNC: 6.6 G/DL — SIGNIFICANT CHANGE UP (ref 6–8.3)
PROTHROM AB SERPL-ACNC: 27.7 SEC — HIGH (ref 10.5–13.4)
RBC # BLD: 4.58 M/UL — SIGNIFICANT CHANGE UP (ref 4.2–5.8)
RBC # FLD: 12.5 % — SIGNIFICANT CHANGE UP (ref 10.3–14.5)
SODIUM SERPL-SCNC: 139 MMOL/L — SIGNIFICANT CHANGE UP (ref 135–145)
WBC # BLD: 5.47 K/UL — SIGNIFICANT CHANGE UP (ref 3.8–10.5)
WBC # FLD AUTO: 5.47 K/UL — SIGNIFICANT CHANGE UP (ref 3.8–10.5)

## 2022-06-30 PROCEDURE — 99232 SBSQ HOSP IP/OBS MODERATE 35: CPT

## 2022-06-30 PROCEDURE — 99233 SBSQ HOSP IP/OBS HIGH 50: CPT

## 2022-06-30 RX ORDER — METOPROLOL TARTRATE 50 MG
1 TABLET ORAL
Qty: 0 | Refills: 0 | DISCHARGE
Start: 2022-06-30

## 2022-06-30 RX ORDER — WARFARIN SODIUM 2.5 MG/1
5 TABLET ORAL
Refills: 0 | Status: COMPLETED | OUTPATIENT
Start: 2022-06-30 | End: 2022-07-01

## 2022-06-30 RX ORDER — WARFARIN SODIUM 2.5 MG/1
1 TABLET ORAL
Qty: 15 | Refills: 0
Start: 2022-06-30 | End: 2022-07-14

## 2022-06-30 RX ORDER — GABAPENTIN 400 MG/1
1 CAPSULE ORAL
Qty: 0 | Refills: 0 | DISCHARGE
Start: 2022-06-30

## 2022-06-30 RX ORDER — WARFARIN SODIUM 2.5 MG/1
1 TABLET ORAL
Qty: 30 | Refills: 0
Start: 2022-06-30

## 2022-06-30 RX ORDER — LANOLIN ALCOHOL/MO/W.PET/CERES
1 CREAM (GRAM) TOPICAL
Qty: 0 | Refills: 0 | DISCHARGE
Start: 2022-06-30

## 2022-06-30 RX ADMIN — GABAPENTIN 100 MILLIGRAM(S): 400 CAPSULE ORAL at 21:35

## 2022-06-30 RX ADMIN — PANTOPRAZOLE SODIUM 40 MILLIGRAM(S): 20 TABLET, DELAYED RELEASE ORAL at 05:05

## 2022-06-30 RX ADMIN — WARFARIN SODIUM 5 MILLIGRAM(S): 2.5 TABLET ORAL at 21:36

## 2022-06-30 RX ADMIN — Medication 25 MILLIGRAM(S): at 05:05

## 2022-06-30 RX ADMIN — POLYETHYLENE GLYCOL 3350 17 GRAM(S): 17 POWDER, FOR SOLUTION ORAL at 07:26

## 2022-06-30 NOTE — DISCHARGE NOTE PROVIDER - NSDCMRMEDTOKEN_GEN_ALL_CORE_FT
gabapentin 100 mg oral capsule: 1 cap(s) orally once a day (at bedtime)  melatonin 3 mg oral tablet: 1 tab(s) orally once a day (at bedtime)  metoprolol succinate 25 mg oral tablet, extended release: 1 tab(s) orally once a day   Dx Cercial spine fracture ICD S 12.7,  Occupational therapy 2x/wk x 6 wks, ADLs, fine motor activity, expecially for left arm: Apply topically to affected area 2 times a week MDD:one  Dx: Cervical spine fracture, ICD S12.7., Physical therapy 2x/wk x 6 wks, muscle strengthening, balance and gait exercises: Apply topically to affected area 2 times a week MDD:one  gabapentin 100 mg oral capsule: 1 cap(s) orally once a day (at bedtime)  melatonin 3 mg oral tablet: 1 tab(s) orally once a day (at bedtime)  metoprolol succinate 25 mg oral tablet, extended release: 1 tab(s) orally once a day  metoprolol succinate 25 mg oral tablet, extended release: 1 tab(s) orally once a day  warfarin 2 mg oral tablet: 1 tab(s) orally once a day   warfarin 5 mg oral tablet: 1 tab(s) orally once a day    gabapentin 100 mg oral capsule: 1 cap(s) orally once a day (at bedtime)  melatonin 3 mg oral tablet: 1 tab(s) orally once a day (at bedtime)  metoprolol succinate 25 mg oral tablet, extended release: 1 tab(s) orally once a day  metoprolol succinate 25 mg oral tablet, extended release: 1 tab(s) orally once a day  warfarin 2 mg oral tablet: 1 tab(s) orally once a day   warfarin 5 mg oral tablet: 1 tab(s) orally once a day

## 2022-06-30 NOTE — DISCHARGE NOTE NURSING/CASE MANAGEMENT/SOCIAL WORK - NSDCPEFALRISK_GEN_ALL_CORE
For information on Fall & Injury Prevention, visit: [email or web address] OR  [email or web address] OR  https://www.cdc.gov/steadi/patient.html

## 2022-06-30 NOTE — DISCHARGE NOTE PROVIDER - CARE PROVIDER_API CALL
Edmar Esposito (MD; DC)  Orthopaedic Surgery  [address], [facility] [address]  [place] [address]  Phone: [phone]  Fax: [phone]  Follow Up Time:

## 2022-06-30 NOTE — PROGRESS NOTE ADULT - ASSESSMENT
82 yo RHD Male with PMhx of Afib on Warfarin, HTN, right rotator cuff tear presented to [facility] 6/15/22 after he hit the back of his head falling off a chair. Found to have C2 fracture and transferred to [facility], non surgical management. Hospital course c/b RUE weakness and bilateral shoulder pain. Found to have right rotator cuff tear. MRI R brachial plexus unremarkable. Plan for outpatient EMG. C Maintain [place] J collar at all times. Admitted for multidisciplinary rehab program 6/21/22.    * INR 2.37 therapeutic, c/w home dose  * DC date to be revised to 7/2,  *PT/OT scripts for home care * Drowziness, mild this AM    #C2 Fracture  - Continue Comprehensive Multidisciplinary Rehab Program for Gait, ADL, Functional impairments  - PT/OT 3 hours a day 5 days a week  - C Collar for OOB activity- clarified with patient's neurosurgery team. May remove C collar while in bed or for showers when supervised, Continue C collar for 6-8 weeks until seen by neurosurgeon.  - Outpatient follow up with Dr. So ([phone], press 9)  - Precautions: Cervical spine, falls, aspiration    # Hyponatremia, resolved  - monitor, encourage PO intake    #Afib  - metoprolol succinate ER 25 mg daily  - INR therapeutic, then c/w alt daily INR  - At home patient takes  5mg 5 days/week and 2.5mg 2 days per week (on weekends)  -Coumadin dose change to home regimen 6/28  (5mg for 5 days and 2.5mg for 2 days)    #Right hand weakness  - Will need UE EMG in 3-4 weeks  - MRI of brachial plexus on 6/17 unremarkable  -C/W gabapentin to 100mg qhs due to drowziness    #Right rotator cuff tear  #Bilateral shoulder pain  - Lidocaine patches to both shoulders  - Cold/warm compresses PRN    #Migraines  - Pt takes Immitrex 25mg daily as needed, may repeat after 2 hrs if headache not aborted  - Hold medication, order PRN if needed    Sleep:  - Maintain quiet hours and low stim environment  - Melatonin 3mg at qhs    Pain:  - Tylenol PRN, lidocaine patches to shoulders, cold compresses PRN    GI/Bowel:  - Senna 2 tabs at qhs, Miralax daily    /Bladder:  - Monitor PVR on admission; SC for >400 cc    Diet: Regular     Skin/ Pressure Injury Prevention:  - assessment on admission-- skin tear on right shin 2cm x 2 cm, no pressure ulcers  - Incisions: none  -OOB to Chair, PT/OT     DVT prophylaxis: Coumadin  - SCDs  ------------------------------  IDT 6/23/22:  RN: Patient is continent. Can be impulsive (paces around his room)  SW: Lives with spouse in 2 homes, one of which has ground floor set up and no SALOMÓN. PTA was independent without AD.  OT: Setup - eating. CG- grooming. Min A- toilet transfers. ModA- UBD. Max A - LBD, toileting, bathing. Tub/shower transfers TBA.  Barriers: impulsive, decreased insight, decreased RUE strength and coordination. Goals: mod I for all ADLs and transfers except supervision for bath/shower transfers.  PT: Transfers with min A. Ambulates 50 ft with min A and no device. Stairs: 4 steps, 2 HR and min A. Barriers: balance, coordination. Goals: mod I  EDOD: 7/5    IDT--6/28  Has good family support  Function--ambulatoing 100ft with RW CGA negotiating 12 steps with I hand rail  Min A for body dressing  Barriers--poor endurance  Goal--mod independence  est dc 7/2 to home  --------------------------------------------  Outpatient Follow up:  Neurology Clinic (for EMG)  [address] [place] [address]  [phone]  --Within 1 month    Orthopedics Clinic  [address] [place] [address]  [phone]  ---Within 6 weeks of acute hospital discharge    Trauma Physician  [address] [address]  [phone]  -- Within 7-10 days  --------------------------------------------  Liaison with family--6/21  Spoke with wife, discussed patients functional status, clinical state and response to therapy  She was happy with same  6/27--Discussed therapy progress and d/c planning with patient and wife, wife participated on phone

## 2022-06-30 NOTE — DISCHARGE NOTE PROVIDER - NSDCFUADDAPPT_GEN_ALL_CORE_FT
Please follow-up with your PCP within 1-2 weeks after your discharge date.    PCP: Dr. Steven Goldberg   Phone: [phone]  Address:  [address], [place] [address]    Please follow-up with the initial appointments scheduled for outpatient therapy @ [facility].  Location: [address] [place] [address]  Phone: [phone]  Appointments: 7/5 @ 9:00AM (OT), 7/7 @ 9:30AM (OT), 7/8 @ 3:00PM (PT) 7/12 @ 9:00AM (OT) and 7/14 @ 9:00AM (OT).  Please schedule additional appointments after your initial visit.

## 2022-06-30 NOTE — DISCHARGE NOTE PROVIDER - NSDCCPCAREPLAN_GEN_ALL_CORE_FT
PRINCIPAL DISCHARGE DIAGNOSIS  Diagnosis: Cervical vertebral fracture  Assessment and Plan of Treatment: c2 vertebral fracture, continue to wear your cervical collar until you follow up with your neurosurgeon. He will examine you and determint when to stop wearing the collar      SECONDARY DISCHARGE DIAGNOSES  Diagnosis: A-fib  Assessment and Plan of Treatment: continue metoprolol 25mg daily  continue alernating dose of coumadin 5mg and 2 mg as you have been doing. Please check your INR level and discuss your result with your physician

## 2022-06-30 NOTE — DISCHARGE NOTE PROVIDER - HOSPITAL COURSE
HPI:  Patient is a 80 yo RHD Male with PMhx of Afib on Warfarin, HTN, right rotator cuff tear who presented to [facility] on 6/15/22 after he hit the back of his head falling off a chair. Patient states he was playing cards on his deck when his chair came off the edge of the deck and he fell backward, striking his head. He reports right shoulder pain since fall that is worse than his usual soreness after playing tennis. Found to have C2 fracture and transferred to [facility] for further management. Patient states he remembers the incident but could not speak or move at all afterward. Also presented with RUE weakness and bilateral shoulder pain. C-spine showed multilevel degenerative changes of C6-7 and C7-T1 with moderate narrowing of the central canal as well as moderate bilateral neural foraminal stenosis, endplate degenerative changes at C7-T1, grade 1 degenerative retrolithesis of C6-C7, and grade 1 degenerative anterolisthesis of C7-T1. 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 MRI shoulder (suspect Right shoulder given pt's complaint) showed moderate AC and GH arthrosis, posterior labral tear, supraspinatus tendinosis with suspected low grade articular sided tearing anterior most fibers, mild infraspinatus and long head of biceps tendinosis. MRI brachial plexus was for persistent RUE weakness was unremarkable. Conservative management per neurosurgery. May resume Wafarin on 6/22. At home patient takes 5mg 5 days/week and 2.5mg 2 days per week. Will need UE EMG in 3-4 weeks.    Patient was evaluated by PM&R and therapy for functional deficits and gait/ ADL impairments and recommended acute rehabilitation. Patient was medically optimized for discharge to [facility] on 6/21/22.      Rehab course with insignificant for acute issues. INR within normal limits. He is to remain with cervical collar and f/u with neurosurgeon to determine when to take off.   Patient has participated in therapy and has made functional gain. All other medical co-morbidities were stable. Patient tolerated course of inpatient PT/OT rehab with significant improvements and met rehab goals prior to discharge. He will need UE EMG in 3-4 weeks for right hand weakness. Patient was medically cleared for discharge to 7/2/22.     Patient is to follow up with these providers:    Neurology Clinic (for EMG)  [address] [place] [address] [phone]  --Within 1 month    Orthopedics Clinic  [address]  [place] [id number] [phone]  ---Within 6 weeks of acute hospital discharge    Trauma Physician  [address] [id number] [phone]  -- Within 7-10 days

## 2022-06-30 NOTE — DISCHARGE NOTE NURSING/CASE MANAGEMENT/SOCIAL WORK - PATIENT PORTAL LINK FT
You can access the FollowMyHealth Patient Portal offered by [facility] by registering at the following website: [email or web address]. By joining [facility]’s FollowMyHealth portal, you will also be able to view your health information using other applications (apps) compatible with our system.

## 2022-06-30 NOTE — PROGRESS NOTE ADULT - ASSESSMENT
80 yo RHD Male with PMhx of Afib on Warfarin, HTN, right rotator cuff tear presented to [facility] 6/15/22 after he hit the back of his head falling off a chair. Found to have C2 fracture and transferred to [facility], non surgical management. Hospital course c/b RUE weakness and bilateral shoulder pain. Found to have right rotator cuff tear. MRI R brachial plexus unremarkable. Plan for outpatient EMG. C Maintain [place] J collar at all times. Admitted for multidisciplinary rehab program 6/21/22.    Hyponatremia, resolved  - Will monitor Na  - Encourage oral intake    Chronic stable Afib, rate controlled  - metoprolol succinate ER 25 mg daily  - At home patient takes  5mg 5 days/week and 2.5mg 2 days per week.   - Coumadin 2.5mg tonight    C2 Fracture  - C Collar for OOB activity- clarified with patient's neurosurgery team. May remove C collar while in bed or for showers when supervised, Continue C collar for 6-8 weeks until seen by neurosurgeon.  - Outpatient follow up with Dr. So ([phone], press 9)  - Precautions: Cervical spine, falls, aspiration    Right hand weakness  - Will need UE EMG in 3-4 weeks  - Patient reports feeling drowzy with gabapentin 300mg at night; decreased to 100mg qhs  - MRI of brachial plexus on 6/17 unremarkable    Right rotator cuff tear/Bilateral shoulder pain  - Lidocaine patches to both shoulders  - Cold/warm compresses PRN    Migraines  - Pt takes Immitrex 25mg daily as needed, may repeat after 2 hrs if headache not aborted  - Hold medication, order PRN if needed    DVT prophylaxis: Coumadin tonight

## 2022-06-30 NOTE — DISCHARGE NOTE NURSING/CASE MANAGEMENT/SOCIAL WORK - NSDCFUADDAPPT_GEN_ALL_CORE_FT
Please follow-up with your PCP within 1-2 weeks after your discharge date.    Please follow-up with the initial appointments scheduled for outpatient therapy @ [facility].  Location: [address] [place] [address]  Phone: [phone]  Appointments: 7/5 @ 9:00AM (OT), 7/7 @ 9:30AM (OT), 7/8 @ 3:00PM (PT) 7/12 @ 9:00AM (OT) and 7/14 @ 9:00AM (OT).  Please make the additional appointments after your initial visit.     Please follow-up with your PCP within 1-2 weeks after your discharge date.    PCP: Dr. Steven Goldberg   Phone: [phone]  Address:  [address], [place] [address]    Please follow-up with the initial appointments scheduled for outpatient therapy @ [facility].  Location: [address] [place] [address]  Phone: [phone]  Appointments: 7/5 @ 9:00AM (OT), 7/7 @ 9:30AM (OT), 7/8 @ 3:00PM (PT) 7/12 @ 9:00AM (OT) and 7/14 @ 9:00AM (OT).  Please schedule additional appointments after your initial visit.

## 2022-06-30 NOTE — DISCHARGE NOTE PROVIDER - NSDCFUSCHEDAPPT_GEN_ALL_CORE_FT
Edmar Esposito  [facility]  ORTHOSURG [address] [facility]  Scheduled Appointment: 07/11/2022

## 2022-07-01 PROCEDURE — 99232 SBSQ HOSP IP/OBS MODERATE 35: CPT

## 2022-07-01 RX ORDER — METOPROLOL TARTRATE 50 MG
1 TABLET ORAL
Qty: 30 | Refills: 0
Start: 2022-07-01 | End: 2022-07-30

## 2022-07-01 RX ORDER — WARFARIN SODIUM 2.5 MG/1
1 TABLET ORAL
Qty: 30 | Refills: 0
Start: 2022-07-01

## 2022-07-01 RX ORDER — GABAPENTIN 400 MG/1
1 CAPSULE ORAL
Qty: 30 | Refills: 0
Start: 2022-07-01 | End: 2022-07-30

## 2022-07-01 RX ADMIN — GABAPENTIN 100 MILLIGRAM(S): 400 CAPSULE ORAL at 19:48

## 2022-07-01 RX ADMIN — PANTOPRAZOLE SODIUM 40 MILLIGRAM(S): 20 TABLET, DELAYED RELEASE ORAL at 05:31

## 2022-07-01 RX ADMIN — Medication 25 MILLIGRAM(S): at 05:31

## 2022-07-01 RX ADMIN — SENNA PLUS 2 TABLET(S): 8.6 TABLET ORAL at 19:48

## 2022-07-01 RX ADMIN — WARFARIN SODIUM 5 MILLIGRAM(S): 2.5 TABLET ORAL at 19:48

## 2022-07-01 NOTE — PROGRESS NOTE ADULT - ASSESSMENT
80 yo RHD Male with PMhx of Afib on Warfarin, HTN, right rotator cuff tear presented to [facility] 6/15/22 after he hit the back of his head falling off a chair. Found to have C2 fracture and transferred to [facility], non surgical management. Hospital course c/b RUE weakness and bilateral shoulder pain. Found to have right rotator cuff tear. MRI R brachial plexus unremarkable. Plan for outpatient EMG. C Maintain [place] J collar at all times. Admitted for multidisciplinary rehab program 6/21/22.    * INR 2.37 therapeutic 6/30, c/w home dose  * DC  7/2,  to home PT/OT    #C2 Fracture  - Continue Comprehensive Multidisciplinary Rehab Program for Gait, ADL, Functional impairments  - PT/OT 3 hours a day 5 days a week  - C Collar for OOB activity- clarified with patient's neurosurgery team. May remove C collar while in bed or for showers when supervised, Continue C collar for 6-8 weeks until seen by neurosurgeon.  - Outpatient follow up with Dr. So ([phone], press 9)  - Precautions: Cervical spine, falls, aspiration    # Hyponatremia, resolved  - monitor, encourage PO intake    #Afib  - metoprolol succinate ER 25 mg daily  - INR therapeutic, then c/w alt daily INR  - At home patient takes  5mg 5 days/week and 2.5mg 2 days per week (on weekends)  -Coumadin dose change to home regimen 6/28  (5mg for 5 days and 2.5mg for 2 days)    #Right hand weakness  - Will need UE EMG in 3-4 weeks  - MRI of brachial plexus on 6/17 unremarkable  -C/W gabapentin to 100mg qhs due to drowziness    #Right rotator cuff tear  #Bilateral shoulder pain  - Lidocaine patches to both shoulders  - Cold/warm compresses PRN    #Migraines  - Pt takes Immitrex 25mg daily as needed, may repeat after 2 hrs if headache not aborted  - Hold medication, order PRN if needed    Sleep:  - Maintain quiet hours and low stim environment  - Melatonin 3mg at qhs    Pain:  - Tylenol PRN, lidocaine patches to shoulders, cold compresses PRN    GI/Bowel:  - Senna 2 tabs at qhs, Miralax daily    /Bladder:  - Monitor PVR on admission; SC for >400 cc    Diet: Regular     Skin/ Pressure Injury Prevention:  - assessment on admission-- skin tear on right shin 2cm x 2 cm, no pressure ulcers  - Incisions: none  -OOB to Chair, PT/OT     DVT prophylaxis: Coumadin  - SCDs  ------------------------------  IDT 6/23/22:  RN: Patient is continent. Can be impulsive (paces around his room)  SW: Lives with spouse in 2 homes, one of which has ground floor set up and no SALOMÓN. PTA was independent without AD.  OT: Setup - eating. CG- grooming. Min A- toilet transfers. ModA- UBD. Max A - LBD, toileting, bathing. Tub/shower transfers TBA.  Barriers: impulsive, decreased insight, decreased RUE strength and coordination. Goals: mod I for all ADLs and transfers except supervision for bath/shower transfers.  PT: Transfers with min A. Ambulates 50 ft with min A and no device. Stairs: 4 steps, 2 HR and min A. Barriers: balance, coordination. Goals: mod I  EDOD: 7/5    IDT--6/28  Has good family support  Function--ambulatoing 100ft with RW CGA negotiating 12 steps with I hand rail  Min A for body dressing  Barriers--poor endurance  Goal--mod independence  est dc 7/2 to home  --------------------------------------------  Outpatient Follow up:  Neurology Clinic (for EMG)  [address] [place] [address]  [phone]  --Within 1 month    Orthopedics Clinic  [address]  [place] [id number] [phone]  ---Within 6 weeks of acute hospital discharge    Trauma Physician  [address] [place] [address]  [phone]  -- Within 7-10 days  --------------------------------------------  Liaison with family--6/21  Spoke with wife, discussed patients functional status, clinical state and response to therapy  She was happy with same  6/27--Discussed therapy progress and d/c planning with patient and wife, wife participated on phone

## 2022-07-01 NOTE — PROGRESS NOTE ADULT - ASSESSMENT
80 yo RHD Male with PMhx of Afib on Warfarin, HTN, right rotator cuff tear presented to [facility] 6/15/22 after he hit the back of his head falling off a chair. Found to have C2 fracture and transferred to [facility], non surgical management. Hospital course c/b RUE weakness and bilateral shoulder pain. Found to have right rotator cuff tear. MRI R brachial plexus unremarkable. Plan for outpatient EMG. C Maintain [place] J collar at all times. Admitted for multidisciplinary rehab program 6/21/22.    Hyponatremia, resolved  - Will monitor Na  - Encourage oral intake    Chronic stable Afib, rate controlled  - metoprolol succinate ER 25 mg daily  - At home patient takes  5mg 5 days/week and 2.5mg 2 days per week.   - Coumadin 5mg tonight, noted INR yesterday 2.37 ( he does not want to check INR daily)    C2 Fracture  - C Collar for OOB activity- clarified with patient's neurosurgery team. May remove C collar while in bed or for showers when supervised, Continue C collar for 6-8 weeks until seen by neurosurgeon.  - Outpatient follow up with Dr. So ([phone], press 9)  - Precautions: Cervical spine, falls, aspiration    Right hand weakness  - Will need UE EMG in 3-4 weeks  - Patient reports feeling drowzy with gabapentin 300mg at night; decreased to 100mg qhs  - MRI of brachial plexus on 6/17 unremarkable    Right rotator cuff tear/Bilateral shoulder pain  - Lidocaine patches to both shoulders  - Cold/warm compresses PRN    Migraines  - Pt takes Immitrex 25mg daily as needed, may repeat after 2 hrs if headache not aborted  - Hold medication, order PRN if needed    DVT prophylaxis: Coumadin tonight

## 2022-07-02 VITALS
TEMPERATURE: 98 F | HEART RATE: 89 BPM | SYSTOLIC BLOOD PRESSURE: 120 MMHG | OXYGEN SATURATION: 96 % | RESPIRATION RATE: 16 BRPM | DIASTOLIC BLOOD PRESSURE: 86 MMHG

## 2022-07-02 DIAGNOSIS — M67.912 UNSPECIFIED DISORDER OF SYNOVIUM AND TENDON, LEFT SHOULDER: ICD-10-CM

## 2022-07-02 DIAGNOSIS — S12.100A UNSPECIFIED DISPLACED FRACTURE OF SECOND CERVICAL VERTEBRA, INITIAL ENCOUNTER FOR CLOSED FRACTURE: ICD-10-CM

## 2022-07-02 DIAGNOSIS — I48.20 CHRONIC ATRIAL FIBRILLATION, UNSPECIFIED: ICD-10-CM

## 2022-07-02 DIAGNOSIS — E87.1 HYPO-OSMOLALITY AND HYPONATREMIA: ICD-10-CM

## 2022-07-02 PROCEDURE — 99232 SBSQ HOSP IP/OBS MODERATE 35: CPT

## 2022-07-02 PROCEDURE — 99238 HOSP IP/OBS DSCHRG MGMT 30/<: CPT

## 2022-07-02 RX ADMIN — Medication 25 MILLIGRAM(S): at 07:02

## 2022-07-02 RX ADMIN — POLYETHYLENE GLYCOL 3350 17 GRAM(S): 17 POWDER, FOR SOLUTION ORAL at 08:10

## 2022-07-02 NOTE — PROGRESS NOTE ADULT - SUBJECTIVE AND OBJECTIVE BOX
Patient seen and examined at bedside. No acute events overnight. Patient is w/o complaints.     ALLERGIES:  No Known Allergies    MEDICATIONS  (STANDING):  lidocaine   4% Patch 1 Patch Transdermal <User Schedule>  melatonin 3 milliGRAM(s) Oral at bedtime  metoprolol succinate ER 25 milliGRAM(s) Oral daily  pantoprazole    Tablet 40 milliGRAM(s) Oral before breakfast  polyethylene glycol 3350 17 Gram(s) Oral daily  senna 2 Tablet(s) Oral at bedtime    MEDICATIONS  (PRN):  acetaminophen     Tablet .. 650 milliGRAM(s) Oral every 6 hours PRN Temp greater or equal to 38C (100.4F), Mild Pain (1 - 3)  bisacodyl Suppository 10 milliGRAM(s) Rectal daily PRN Constipation    Vital Signs Last 24 Hrs  T(F): 97.5 (25 Jun 2022 08:59), Max: 97.7 (24 Jun 2022 20:28)  HR: 72 (25 Jun 2022 08:59) (72 - 89)  BP: 116/81 (25 Jun 2022 08:59) (116/81 - 123/84)  RR: 15 (25 Jun 2022 08:59) (15 - 16)  SpO2: 97% (25 Jun 2022 08:59) (97% - 98%)  I&O's Summary    BMI (kg/m2): 27.6 (06-21-22 @ 13:21)  PHYSICAL EXAM:  Gen: nad, resting in bed  Neuro: aaox3, no focal deficits  Heent: eomi b/l, no jvd, no oral exudates  Pulm: cta b/l, no w/r/r  CV: +s1s2, no m/r/g  Ab: soft, nt/nd, normoactive bs x 4  Extrem: no edema, pulses intact and equal      LABS:                        14.8   5.31  )-----------( 127      ( 23 Jun 2022 06:17 )             44.2       06-24    135  |  102  |  20  ----------------------------<  103  4.4   |  27  |  0.94    Ca    10.0      24 Jun 2022 06:05    TPro  6.5  /  Alb  3.1  /  TBili  0.7  /  DBili  x   /  AST  112  /  ALT  118  /  AlkPhos  54  06-23       PT/INR - ( 25 Jun 2022 05:45 )   PT: 18.0 sec;   INR: 1.54 ratio                                        COVID-19 PCR: NotDetec (06-21-22 @ 20:40)      RADIOLOGY & ADDITIONAL TESTS:    Care Discussed with Consultants/Other Providers:   
Patient is a 81y old  Male who presents with a chief complaint of C2 fracture and epidural hematoma (28 Jun 2022 16:09)    No events overnight  Denies chest pain, SOB  Patient seen and examined at bedside.    ALLERGIES:  No Known Allergies    MEDICATIONS  (STANDING):  gabapentin 100 milliGRAM(s) Oral at bedtime  lidocaine   4% Patch 1 Patch Transdermal <User Schedule>  melatonin 3 milliGRAM(s) Oral at bedtime  metoprolol succinate ER 25 milliGRAM(s) Oral daily  pantoprazole    Tablet 40 milliGRAM(s) Oral before breakfast  polyethylene glycol 3350 17 Gram(s) Oral <User Schedule>  senna 2 Tablet(s) Oral at bedtime    MEDICATIONS  (PRN):  acetaminophen     Tablet .. 650 milliGRAM(s) Oral every 6 hours PRN Temp greater or equal to 38C (100.4F), Mild Pain (1 - 3)  bisacodyl Suppository 10 milliGRAM(s) Rectal daily PRN Constipation    Vital Signs Last 24 Hrs  T(F): 98 (29 Jun 2022 08:09), Max: 98 (29 Jun 2022 08:09)  HR: 85 (29 Jun 2022 08:09) (82 - 85)  BP: 114/82 (29 Jun 2022 08:09) (114/82 - 132/85)  RR: 16 (29 Jun 2022 08:09) (14 - 16)  SpO2: 95% (29 Jun 2022 08:09) (95% - 96%)  I&O's Summary      PHYSICAL EXAM:  General: NAD, A/O x 3  ENT: MMM, no scleral icterus  Neck: Supple, No JVD, no thyroidomegaly  Lungs: Clear to auscultation bilaterally, no wheezes, no rales, no rhonchi, good inspiratory effort  Cardio: RRR, S1/S2, No murmurs  Abdomen: Soft, Nontender, Nondistended; Bowel sounds present  Extremities: No calf tenderness, No pitting edema, no skin changes    LABS:                        14.3   6.80  )-----------( 147      ( 27 Jun 2022 06:50 )             42.4       06-27    138  |  103  |  18  ----------------------------<  95  4.6   |  30  |  0.91    Ca    10.1      27 Jun 2022 06:50    TPro  6.7  /  Alb  3.4  /  TBili  0.7  /  DBili  x   /  AST  29  /  ALT  60  /  AlkPhos  61  06-27       PT/INR - ( 27 Jun 2022 06:50 )   PT: 23.8 sec;   INR: 2.04 ratio    PTT - ( 26 Jun 2022 12:15 )  PTT:31.2 sec     COVID-19 PCR: NotDetec (06-28-22 @ 06:00)  COVID-19 PCR: NotDetec (06-21-22 @ 20:40)  COVID-19 PCR: NotDetec (06-28-22 @ 06:00)  COVID-19 PCR: NotDetec (06-21-22 @ 20:40)      Care Discussed with Consultants/Other Providers: Rehab  
Patient is a 81y old  Male who presents with a chief complaint of C2 fracture and epidural hematoma (29 Jun 2022 12:04)      No events overnight  Denies chest pain, SOB    Patient seen and examined at bedside.    ALLERGIES:  No Known Allergies    MEDICATIONS  (STANDING):  gabapentin 100 milliGRAM(s) Oral at bedtime  lidocaine   4% Patch 1 Patch Transdermal <User Schedule>  melatonin 3 milliGRAM(s) Oral at bedtime  metoprolol succinate ER 25 milliGRAM(s) Oral daily  pantoprazole    Tablet 40 milliGRAM(s) Oral before breakfast  polyethylene glycol 3350 17 Gram(s) Oral <User Schedule>  senna 2 Tablet(s) Oral at bedtime    MEDICATIONS  (PRN):  acetaminophen     Tablet .. 650 milliGRAM(s) Oral every 6 hours PRN Temp greater or equal to 38C (100.4F), Mild Pain (1 - 3)  bisacodyl Suppository 10 milliGRAM(s) Rectal daily PRN Constipation    Vital Signs Last 24 Hrs  T(F): 98 (30 Jun 2022 08:26), Max: 98 (30 Jun 2022 08:26)  HR: 70 (30 Jun 2022 08:26) (70 - 89)  BP: 125/74 (30 Jun 2022 08:26) (123/81 - 136/81)  RR: 16 (30 Jun 2022 08:26) (16 - 16)  SpO2: 95% (30 Jun 2022 08:26) (94% - 95%)  I&O's Summary      PHYSICAL EXAM:  General: NAD, A/O x 3  ENT: MMM, no scleral icterus  Neck: Supple, No JVD, no thyroidomegaly  Lungs: Clear to auscultation bilaterally, no wheezes, no rales, no rhonchi, good inspiratory effort  Cardio: RRR, S1/S2, No murmurs  Abdomen: Soft, Nontender, Nondistended; Bowel sounds present  Extremities: No calf tenderness, No pitting edema, no skin changes    LABS:                        14.0   5.47  )-----------( 154      ( 30 Jun 2022 06:43 )             41.3       06-30    139  |  106  |  16  ----------------------------<  98  4.3   |  25  |  1.01    Ca    9.9      30 Jun 2022 06:43    TPro  6.6  /  Alb  3.3  /  TBili  0.6  /  DBili  x   /  AST  22  /  ALT  34  /  AlkPhos  63  06-30       PT/INR - ( 30 Jun 2022 06:43 )   PT: 27.7 sec;   INR: 2.37 ratio      COVID-19 PCR: NotDetec (06-28-22 @ 06:00)  COVID-19 PCR: NotDetec (06-21-22 @ 20:40)  COVID-19 PCR: NotDetec (06-28-22 @ 06:00)  COVID-19 PCR: NotDetec (06-21-22 @ 20:40)      Care Discussed with Consultants/Other Providers: Rehab  
SUBJECTIVE:  Patient seen and examined,  No interval med events  Tolerating diet and therapy, had normal BM today  Still reporting AM drowziness  Wants to recommence home dose of warfarin 5mg instead of current slightly elevated dose of 5mg  Rt shin skin break healing    IDT--has good family support  Function--ambulatoing 100ft with RW CGA negotiating 12 steps with I hand rail  Min A for body dressing  Barriers--poor endurance  Goal--mod independence  est dc 7/7    ROS: Reports no headache, dizziness, chest pain, dyspnea, abd pain, dysuria.  + R shoulder pain, RUE weakness    Vital Signs Last 24 Hrs  T(C): 36.4 (28 Jun 2022 08:26), Max: 36.7 (27 Jun 2022 19:23)  T(F): 97.6 (28 Jun 2022 08:26), Max: 98.1 (27 Jun 2022 19:23)  HR: 79 (28 Jun 2022 08:26) (79 - 90)  BP: 116/80 (28 Jun 2022 08:26) (116/80 - 118/95)  RR: 14 (28 Jun 2022 08:26) (14 - 15)  SpO2: 95% (28 Jun 2022 08:26) (95% - 99%)      PHYSICAL EXAM  Constitutional - NAD, Comfortable  HEENT - WALDEMAR  Neck - Supple, No limited ROM  Chest - clear   Cardio - Regular HR, irregular rhythm  Abdomen -  Soft,  non tender   Extremities - No peripheral edema, No calf tenderness. +right shin skin tear dressing c/d/i.     Neurologic Exam: AAOx4  MMT:          LEFT    UE - ShAB 5/5, EF 5/5, EE 5/5, WE 5/5,  WNL                    RIGHT UE - ShAB 3/5 (limited by pain with AROM restricted), EF 5/5, EE 5/5, WE 5/5,  3/5, finger abduction 3/5                    Bilateral Lower extremities - 5/5     Sensory - Intact to LT bilaterally  Psychiatric - Mood stable                        14.3   6.80  )-----------( 147      ( 27 Jun 2022 06:50 )             42.4     06-27    138  |  103  |  18  ----------------------------<  95  4.6   |  30  |  0.91    Ca    10.1      27 Jun 2022 06:50    TPro  6.7  /  Alb  3.4  /  TBili  0.7  /  DBili  x   /  AST  29  /  ALT  60<H>  /  AlkPhos  61  06-27    PT/INR - ( 27 Jun 2022 06:50 )   PT: 23.8 sec;   INR: 2.04 ratio         PTT - ( 26 Jun 2022 12:15 )  PTT:31.2 sec      MEDICATIONS  (STANDING):  gabapentin 300 milliGRAM(s) Oral at bedtime  lidocaine   4% Patch 1 Patch Transdermal <User Schedule>  melatonin 3 milliGRAM(s) Oral at bedtime  metoprolol succinate ER 25 milliGRAM(s) Oral daily  pantoprazole    Tablet 40 milliGRAM(s) Oral before breakfast  polyethylene glycol 3350 17 Gram(s) Oral <User Schedule>  senna 2 Tablet(s) Oral at bedtime  warfarin 2.5 milliGRAM(s) Oral once    MEDICATIONS  (PRN):  acetaminophen     Tablet .. 650 milliGRAM(s) Oral every 6 hours PRN Temp greater or equal to 38C (100.4F), Mild Pain (1 - 3)  bisacodyl Suppository 10 milliGRAM(s) Rectal daily PRN Constipation                     
SUBJECTIVE:  Patient seen and examined, observed during therapy.  Sitting comfortably in WC wearing C-collar.  Requested suppository this AM but still has not had a bowel movement.  Last BM was 6/19.  Patient agreeable to try enema this afternoon after therapy.  Denies abdominal pain, N/V.  Encourage to increase his PO water intake.    Labs - INR 1.15, Na 132 (?lab error- will repeat 6/24)    ROS: Reports no headache, dizziness, chest pain, dyspnea, abd pain, dysuria.  + constipation  +R shoulder pain, RUE weakness    Vital Signs Last 24 Hrs  T(C): 36.7 (23 Jun 2022 08:27), Max: 36.7 (23 Jun 2022 08:27)  T(F): 98 (23 Jun 2022 08:27), Max: 98 (23 Jun 2022 08:27)  HR: 85 (23 Jun 2022 08:27) (84 - 92)  BP: 124/90 (23 Jun 2022 08:27) (124/90 - 132/94)  RR: 15 (23 Jun 2022 08:27) (15 - 16)  SpO2: 95% (23 Jun 2022 08:27) (94% - 95%)      PHYSICAL EXAM  Constitutional - NAD, Comfortable  HEENT - NCAT, EOMI  Neck - Supple, No limited ROM  Chest - good chest expansion, good respiratory effort  Cardio - regular HR, irregular rhythm  Abdomen -  Soft, NTND, decreased BS  Extremities - No peripheral edema, No calf tenderness. +right shin skin tear dressing c/d/i. Both hands cool to touch, right hand edema  Neurologic Exam: AAOx4  MMT:          LEFT    UE - ShAB 5/5, EF 5/5, EE 5/5, WE 5/5,  WNL                    RIGHT UE - ShAB 3/5 (limited by pain with AROM restricted), EF 5/5, EE 5/5, WE 4/5,  3/5, finger abduction 2/5                    Bilateral Lower extremities - 5/5     Sensory - Intact to LT bilaterally  Psychiatric - Mood stable      RECENT LABS    06-23    132<L>  |  101  |  21  ----------------------------<  97  4.3   |  24  |  0.94    06-22    137  |  103  |  20  ----------------------------<  103<H>  4.1   |  30  |  0.82    Ca    9.9      23 Jun 2022 06:17  Ca    9.6      22 Jun 2022 05:35    TPro  6.5  /  Alb  3.1<L>  /  TBili  0.7  /  DBili  x   /  AST  112<H>  /  ALT  118<H>  /  AlkPhos  54  06-23  TPro  6.2  /  Alb  3.3  /  TBili  0.8  /  DBili  x   /  AST  89<H>  /  ALT  88<H>  /  AlkPhos  53  06-22      PT/INR - ( 23 Jun 2022 06:17 )   PT: 13.4 sec;   INR: 1.15 ratio                            14.8   5.31  )-----------( 127      ( 23 Jun 2022 06:17 )             44.2                         13.5   5.30  )-----------( 131      ( 22 Jun 2022 05:35 )             39.4     CAPILLARY BLOOD GLUCOSE    MEDICATIONS  (STANDING):  lidocaine   4% Patch 1 Patch Transdermal <User Schedule>  melatonin 3 milliGRAM(s) Oral at bedtime  metoprolol succinate ER 25 milliGRAM(s) Oral daily  pantoprazole    Tablet 40 milliGRAM(s) Oral before breakfast  polyethylene glycol 3350 17 Gram(s) Oral two times a day  saline laxative (FLEET) Rectal Enema 1 Enema Rectal <User Schedule>  senna 2 Tablet(s) Oral at bedtime  warfarin 5 milliGRAM(s) Oral once    MEDICATIONS  (PRN):  acetaminophen     Tablet .. 650 milliGRAM(s) Oral every 6 hours PRN Temp greater or equal to 38C (100.4F), Mild Pain (1 - 3)  bisacodyl Suppository 10 milliGRAM(s) Rectal daily PRN Constipation        
Cc: Gait dysfunction    HPI: Patient with no new medical issues today. Has some neuropathic pain in his Upper extremities   Pain controlled, no chest pain, no N/V, no Fevers/Chills. No other new ROS  Has been tolerating rehabilitation program.    acetaminophen     Tablet .. 650 milliGRAM(s) Oral every 6 hours PRN  bisacodyl Suppository 10 milliGRAM(s) Rectal daily PRN  lidocaine   4% Patch 1 Patch Transdermal <User Schedule>  melatonin 3 milliGRAM(s) Oral at bedtime  metoprolol succinate ER 25 milliGRAM(s) Oral daily  pantoprazole    Tablet 40 milliGRAM(s) Oral before breakfast  polyethylene glycol 3350 17 Gram(s) Oral daily  senna 2 Tablet(s) Oral at bedtime  warfarin 5 milliGRAM(s) Oral once      T(C): 36.4 (06-25-22 @ 08:59), Max: 36.5 (06-24-22 @ 20:28)  HR: 72 (06-25-22 @ 08:59) (72 - 89)  BP: 116/81 (06-25-22 @ 08:59) (116/81 - 123/84)  RR: 15 (06-25-22 @ 08:59) (15 - 16)  SpO2: 97% (06-25-22 @ 08:59) (97% - 98%)    In NAD  HEENT- EOMI  Heart- RRR, S1S2  Lungs- CTA bl.  Abd- + BS, NT  Ext- No calf pain  Neuro- Exam unchanged          Imp: Patient with diagnosis of SCI admitted for comprehensive acute rehabilitation.    Plan:  - Continue PT/OT/SLP  - DVT prophylaxis  - Skin- Turn q2h, check skin daily  - Continue current medications; patient medically stable.   -Active issues- continue manage bowel/bladder  Pain- gabapentin 300 qhs   - Patient is stable to continue current rehabilitation program.   
Cc: Gait dysfunction    Subjective: Patient with no new medical issues today.  Scheduled for discharge home today  Denies any acute issues overnight  Unsure if he should take gabapentin as it makes him sleepy       T(C): 36.8 (07-02-22 @ 08:51), Max: 36.8 (07-02-22 @ 08:51)  HR: 89 (07-02-22 @ 08:51) (89 - 92)  BP: 120/86 (07-02-22 @ 08:51) (114/77 - 120/86)  RR: 16 (07-02-22 @ 08:51) (15 - 16)  SpO2: 96% (07-02-22 @ 08:51) (96% - 98%)    In NAD  Has c- collar  Cardiac: well perfused  Lungs- CTA bl.  Abd- + BS, NT  Ext- No calf pain  Has hand weakness and impaired coordination         MEDICATIONS  (STANDING):  gabapentin 100 milliGRAM(s) Oral at bedtime  lidocaine   4% Patch 1 Patch Transdermal <User Schedule>  melatonin 3 milliGRAM(s) Oral at bedtime  metoprolol succinate ER 25 milliGRAM(s) Oral daily  pantoprazole    Tablet 40 milliGRAM(s) Oral before breakfast  polyethylene glycol 3350 17 Gram(s) Oral <User Schedule>  senna 2 Tablet(s) Oral at bedtime    MEDICATIONS  (PRN):  acetaminophen     Tablet .. 650 milliGRAM(s) Oral every 6 hours PRN Temp greater or equal to 38C (100.4F), Mild Pain (1 - 3)  bisacodyl Suppository 10 milliGRAM(s) Rectal daily PRN Constipation              Imp: Patient with diagnosis of fall, hit head - C2 fracture- non surgical management admitted for comprehensive acute rehabilitation.    Patient has met rehab goals and scheduled for discharge home today   FU with PCP, NS discussed   Stable for dc home 
Patient is a 81y old  Male who presents with a chief complaint of C2 fracture and epidural hematoma (01 Jul 2022 12:05)      History, interim events and clinically pertinent issues reviewed; patient interviewed and examined.  he has no complaints and is anxious to go home.  Pain controlled.    ALLERGIES:  No Known Allergies    MEDICATIONS  (STANDING):  gabapentin 100 milliGRAM(s) Oral at bedtime  lidocaine   4% Patch 1 Patch Transdermal <User Schedule>  melatonin 3 milliGRAM(s) Oral at bedtime  metoprolol succinate ER 25 milliGRAM(s) Oral daily  pantoprazole    Tablet 40 milliGRAM(s) Oral before breakfast  polyethylene glycol 3350 17 Gram(s) Oral <User Schedule>  senna 2 Tablet(s) Oral at bedtime    MEDICATIONS  (PRN):  acetaminophen     Tablet .. 650 milliGRAM(s) Oral every 6 hours PRN Temp greater or equal to 38C (100.4F), Mild Pain (1 - 3)  bisacodyl Suppository 10 milliGRAM(s) Rectal daily PRN Constipation    Vital Signs Last 24 Hrs  T(F): 97.9 (02 Jul 2022 07:04), Max: 97.9 (02 Jul 2022 07:04)  HR: 89 (02 Jul 2022 07:04) (76 - 92)  BP: 114/77 (02 Jul 2022 07:04) (114/77 - 126/71)  RR: 15 (02 Jul 2022 07:04) (15 - 16)  SpO2: 98% (02 Jul 2022 07:04) (96% - 98%)  I&O's Summary              PHYSICAL EXAM:  General: NAD, A/O x 3  ENT: MMM  Neck: Cervical collar in place  Lungs: Clear to auscultation bilaterally  Cardio: RRR, S1/S2, No murmurs  Abdomen: Soft, Nontender, Nondistended; Bowel sounds present  Extremities: No calf tenderness, No pitting edema      LABS:                        14.0   5.47  )-----------( 154      ( 30 Jun 2022 06:43 )             41.3       06-30    139  |  106  |  16  ----------------------------<  98  4.3   |  25  |  1.01    Ca    9.9      30 Jun 2022 06:43    TPro  6.6  /  Alb  3.3  /  TBili  0.6  /  DBili  x   /  AST  22  /  ALT  34  /  AlkPhos  63  06-30       PT/INR - ( 30 Jun 2022 06:43 )   PT: 27.7 sec;   INR: 2.37 ratio                                  COVID-19 PCR: NotDetec (06-28-22 @ 06:00)  COVID-19 PCR: NotDetec (06-21-22 @ 20:40)          
SUBJECTIVE:  Patient seen and examined, observed during therapy.  chart reviewed  Had BM yesterday, didnt require suppository    Patient agreeable to try enema this afternoon after therapy.  Denies abdominal pain, N/V.  Encourage to increase his PO water intake.    Labs - INR 1.27--low will increase coumadin level    Discussed details for IDT yesterday--need for safety precautions, avoid risk taking,   ROS: Reports no headache, dizziness, chest pain, dyspnea, abd pain, dysuria.  + R shoulder pain, RUE weakness    Vital Signs Last 24 Hrs  T(C): 36.4 (24 Jun 2022 08:21), Max: 36.6 (23 Jun 2022 20:32)  T(F): 97.6 (24 Jun 2022 08:21), Max: 97.9 (23 Jun 2022 20:32)  HR: 86 (24 Jun 2022 08:21) (77 - 86)  BP: 119/83 (24 Jun 2022 08:21) (117/78 - 142/90)  RR: 14 (24 Jun 2022 08:21) (14 - 15)  SpO2: 99% (24 Jun 2022 08:21) (97% - 99%)    PHYSICAL EXAM  Constitutional - NAD, Comfortable  HEENT - WALDEMAR  Neck - Supple, No limited ROM  Chest - clear   Cardio - Regular HR, irregular rhythm  Abdomen -  Soft, NTND, decreased BS  Extremities - No peripheral edema, No calf tenderness. +right shin skin tear dressing c/d/i. Both hands cool to touch, right hand edema    Neurologic Exam: AAOx4  MMT:          LEFT    UE - ShAB 5/5, EF 5/5, EE 5/5, WE 5/5,  WNL                    RIGHT UE - ShAB 3/5 (limited by pain with AROM restricted), EF 5/5, EE 5/5, WE 4/5,  3/5, finger abduction 2/5                    Bilateral Lower extremities - 5/5     Sensory - Intact to LT bilaterally  Psychiatric - Mood stable                          14.8   5.31  )-----------( 127      ( 23 Jun 2022 06:17 )             44.2     06-24    135  |  102  |  20  ----------------------------<  103<H>  4.4   |  27  |  0.94    Ca    10.0      24 Jun 2022 06:05    TPro  6.5  /  Alb  3.1<L>  /  TBili  0.7  /  DBili  x   /  AST  112<H>  /  ALT  118<H>  /  AlkPhos  54  06-23    PT/INR - ( 24 Jun 2022 06:05 )   PT: 14.8 sec;   INR: 1.27 ratio     MEDICATIONS  (STANDING):  lidocaine   4% Patch 1 Patch Transdermal <User Schedule>  melatonin 3 milliGRAM(s) Oral at bedtime  metoprolol succinate ER 25 milliGRAM(s) Oral daily  pantoprazole    Tablet 40 milliGRAM(s) Oral before breakfast  senna 2 Tablet(s) Oral at bedtime  warfarin 5 milliGRAM(s) Oral once    MEDICATIONS  (PRN):  acetaminophen     Tablet .. 650 milliGRAM(s) Oral every 6 hours PRN Temp greater or equal to 38C (100.4F), Mild Pain (1 - 3)  bisacodyl Suppository 10 milliGRAM(s) Rectal daily PRN Constipation                    
    Patient seen and examined at bedside. Patient notes constipation. Reviewed importance of INR draw. 10 point ROS reviewed and negative    ALLERGIES:  No Known Allergies    MEDICATIONS  (STANDING):  gabapentin 300 milliGRAM(s) Oral at bedtime  lidocaine   4% Patch 1 Patch Transdermal <User Schedule>  melatonin 3 milliGRAM(s) Oral at bedtime  metoprolol succinate ER 25 milliGRAM(s) Oral daily  pantoprazole    Tablet 40 milliGRAM(s) Oral before breakfast  senna 2 Tablet(s) Oral at bedtime  warfarin 5 milliGRAM(s) Oral once    MEDICATIONS  (PRN):  acetaminophen     Tablet .. 650 milliGRAM(s) Oral every 6 hours PRN Temp greater or equal to 38C (100.4F), Mild Pain (1 - 3)  bisacodyl Suppository 10 milliGRAM(s) Rectal daily PRN Constipation    Vital Signs Last 24 Hrs  T(F): 97.8 (26 Jun 2022 09:58), Max: 97.8 (26 Jun 2022 09:58)  HR: 88 (26 Jun 2022 09:58) (79 - 88)  BP: 123/86 (26 Jun 2022 09:58) (113/82 - 123/86)  RR: 15 (26 Jun 2022 09:58) (15 - 16)  SpO2: 99% (26 Jun 2022 09:58) (97% - 99%)  I&O's Summary    BMI (kg/m2): 27.6 (06-21-22 @ 13:21)  PHYSICAL EXAM:  Gen: nad, resting in bed  Neuro: aaox3, no focal deficits  Heent: eomi b/l, no jvd, no oral exudates  Pulm: cta b/l, no w/r/r  CV: +s1s2, no m/r/g  Ab: soft, nt/nd, normoactive bs x 4  Extrem: no edema, pulses intact and equal      LABS:      06-24    135  |  102  |  20  ----------------------------<  103  4.4   |  27  |  0.94    Ca    10.0      24 Jun 2022 06:05         PT/INR - ( 25 Jun 2022 05:45 )   PT: 18.0 sec;   INR: 1.54 ratio                                        COVID-19 PCR: NotDetec (06-21-22 @ 20:40)      RADIOLOGY & ADDITIONAL TESTS:    Care Discussed with Consultants/Other Providers:   
Patient is a 81y old  Male who presents with a chief complaint of C2 fracture and epidural hematoma (22 Jun 2022 11:23)    No events overnight  Denies chest pain, SOB  Patient seen and examined at bedside.    ALLERGIES:  No Known Allergies    MEDICATIONS  (STANDING):  lidocaine   4% Patch 1 Patch Transdermal <User Schedule>  melatonin 3 milliGRAM(s) Oral at bedtime  metoprolol succinate ER 25 milliGRAM(s) Oral daily  pantoprazole    Tablet 40 milliGRAM(s) Oral before breakfast  polyethylene glycol 3350 17 Gram(s) Oral two times a day  senna 2 Tablet(s) Oral at bedtime    MEDICATIONS  (PRN):  acetaminophen     Tablet .. 650 milliGRAM(s) Oral every 6 hours PRN Temp greater or equal to 38C (100.4F), Mild Pain (1 - 3)  bisacodyl Suppository 10 milliGRAM(s) Rectal daily PRN Constipation    Vital Signs Last 24 Hrs  T(F): 98 (23 Jun 2022 08:27), Max: 98 (23 Jun 2022 08:27)  HR: 85 (23 Jun 2022 08:27) (84 - 92)  BP: 124/90 (23 Jun 2022 08:27) (124/90 - 132/94)  RR: 15 (23 Jun 2022 08:27) (15 - 16)  SpO2: 95% (23 Jun 2022 08:27) (94% - 95%)  I&O's Summary    BMI (kg/m2): 27.6 (06-21-22 @ 13:21)  PHYSICAL EXAM:  General: NAD, A/O x 3  ENT: MMM, no scleral icterus  Neck: Supple, No JVD, no thyroidomegaly  Lungs: Clear to auscultation bilaterally, no wheezes, no rales, no rhonchi, good inspiratory effort  Cardio: RRR, S1/S2, No murmurs  Abdomen: Soft, Nontender, Nondistended; Bowel sounds present  Extremities: No calf tenderness, No pitting edema, no skin changes    LABS:                        14.8   5.31  )-----------( 127      ( 23 Jun 2022 06:17 )             44.2       06-23    132  |  101  |  21  ----------------------------<  97  4.3   |  24  |  0.94    Ca    9.9      23 Jun 2022 06:17    TPro  6.5  /  Alb  3.1  /  TBili  0.7  /  DBili  x   /  AST  112  /  ALT  118  /  AlkPhos  54  06-23     PT/INR - ( 23 Jun 2022 06:17 )   PT: 13.4 sec;   INR: 1.15 ratio      COVID-19 PCR: NotDetec (06-21-22 @ 20:40)  COVID-19 PCR: NotDetec (06-21-22 @ 20:40)          Care Discussed with Consultants/Other Providers: REhab  
Patient is a 81y old  Male who presents with a chief complaint of C2 fracture and epidural hematoma (23 Jun 2022 13:23)    REports feeling well  Denies chest pain, SOB  Had a BM yesterday after suppository  REports still having tingling in right hand  Patient seen and examined at bedside.    ALLERGIES:  No Known Allergies    MEDICATIONS  (STANDING):  lidocaine   4% Patch 1 Patch Transdermal <User Schedule>  melatonin 3 milliGRAM(s) Oral at bedtime  metoprolol succinate ER 25 milliGRAM(s) Oral daily  pantoprazole    Tablet 40 milliGRAM(s) Oral before breakfast  polyethylene glycol 3350 17 Gram(s) Oral two times a day  saline laxative (FLEET) Rectal Enema 1 Enema Rectal <User Schedule>  senna 2 Tablet(s) Oral at bedtime    MEDICATIONS  (PRN):  acetaminophen     Tablet .. 650 milliGRAM(s) Oral every 6 hours PRN Temp greater or equal to 38C (100.4F), Mild Pain (1 - 3)  bisacodyl Suppository 10 milliGRAM(s) Rectal daily PRN Constipation    Vital Signs Last 24 Hrs  T(F): 97.6 (24 Jun 2022 08:21), Max: 97.9 (23 Jun 2022 20:32)  HR: 86 (24 Jun 2022 08:21) (77 - 86)  BP: 119/83 (24 Jun 2022 08:21) (117/78 - 142/90)  RR: 14 (24 Jun 2022 08:21) (14 - 15)  SpO2: 99% (24 Jun 2022 08:21) (97% - 99%)  I&O's Summary    BMI (kg/m2): 27.6 (06-21-22 @ 13:21)  PHYSICAL EXAM:  General: NAD, A/O x 3, speaking in full sentences, pleasant  ENT: MMM, no scleral icterus  Neck: Supple, No JVD, no thyroidomegaly  Lungs: Clear to auscultation bilaterally, no wheezes, no rales, no rhonchi, good inspiratory effort  Cardio: RRR, S1/S2, No murmurs  Abdomen: Soft, Nontender, Nondistended; Bowel sounds present  Extremities: RUE weakness noted, cannot make full fist, moving all fingers, +2 radial and brachial pulses,  No calf tenderness, No pitting edema, no skin changes    LABS:                        14.8   5.31  )-----------( 127      ( 23 Jun 2022 06:17 )             44.2       06-24    135  |  102  |  20  ----------------------------<  103  4.4   |  27  |  0.94    Ca    10.0      24 Jun 2022 06:05    TPro  6.5  /  Alb  3.1  /  TBili  0.7  /  DBili  x   /  AST  112  /  ALT  118  /  AlkPhos  54  06-23       PT/INR - ( 24 Jun 2022 06:05 )   PT: 14.8 sec;   INR: 1.27 ratio      COVID-19 PCR: NotDetec (06-21-22 @ 20:40)  COVID-19 PCR: NotDetec (06-21-22 @ 20:40)        Care Discussed with Consultants/Other Providers: Rehab  
Patient is a 81y old  Male who presents with a chief complaint of C2 fracture and epidural hematoma (27 Jun 2022 12:56)    No events overnight  Patient seen and examined at bedside.    ALLERGIES:  No Known Allergies    MEDICATIONS  (STANDING):  gabapentin 300 milliGRAM(s) Oral at bedtime  lidocaine   4% Patch 1 Patch Transdermal <User Schedule>  melatonin 3 milliGRAM(s) Oral at bedtime  metoprolol succinate ER 25 milliGRAM(s) Oral daily  pantoprazole    Tablet 40 milliGRAM(s) Oral before breakfast  polyethylene glycol 3350 17 Gram(s) Oral <User Schedule>  senna 2 Tablet(s) Oral at bedtime  warfarin 6 milliGRAM(s) Oral <User Schedule>    MEDICATIONS  (PRN):  acetaminophen     Tablet .. 650 milliGRAM(s) Oral every 6 hours PRN Temp greater or equal to 38C (100.4F), Mild Pain (1 - 3)  bisacodyl Suppository 10 milliGRAM(s) Rectal daily PRN Constipation    Vital Signs Last 24 Hrs  T(F): 97.5 (27 Jun 2022 08:32), Max: 98 (26 Jun 2022 19:16)  HR: 78 (27 Jun 2022 08:32) (78 - 95)  BP: 121/84 (27 Jun 2022 08:32) (110/73 - 121/84)  RR: 15 (27 Jun 2022 08:32) (15 - 16)  SpO2: 99% (27 Jun 2022 08:32) (97% - 99%)  I&O's Summary      PHYSICAL EXAM:  General: NAD, A/O x 3  ENT: MMM, no scleral icterus  Neck: Supple, No JVD, no thyroidomegaly  Lungs: Clear to auscultation bilaterally, no wheezes, no rales, no rhonchi, good inspiratory effort  Cardio: RRR, S1/S2, No murmurs  Abdomen: Soft, Nontender, Nondistended; Bowel sounds present  Extremities: No calf tenderness, No pitting edema, no skin changes    LABS:                        14.3   6.80  )-----------( 147      ( 27 Jun 2022 06:50 )             42.4       06-27    138  |  103  |  18  ----------------------------<  95  4.6   |  30  |  0.91    Ca    10.1      27 Jun 2022 06:50    TPro  6.7  /  Alb  3.4  /  TBili  0.7  /  DBili  x   /  AST  29  /  ALT  60  /  AlkPhos  61  06-27       PT/INR - ( 27 Jun 2022 06:50 )   PT: 23.8 sec;   INR: 2.04 ratio    PTT - ( 26 Jun 2022 12:15 )  PTT:31.2 sec     COVID-19 PCR: NotDetec (06-21-22 @ 20:40)  COVID-19 PCR: NotDetec (06-21-22 @ 20:40)        Care Discussed with Consultants/Other Providers: Rehab  
Patient is a 81y old  Male who presents with a chief complaint of C2 fracture and epidural hematoma (27 Jun 2022 13:11)    NO events overnight  Had a BM yesterday; tolerating diet  Reports that he feels drowsy with the gabapentin  Patient seen and examined at bedside.    ALLERGIES:  No Known Allergies    MEDICATIONS  (STANDING):  gabapentin 300 milliGRAM(s) Oral at bedtime  lidocaine   4% Patch 1 Patch Transdermal <User Schedule>  melatonin 3 milliGRAM(s) Oral at bedtime  metoprolol succinate ER 25 milliGRAM(s) Oral daily  pantoprazole    Tablet 40 milliGRAM(s) Oral before breakfast  polyethylene glycol 3350 17 Gram(s) Oral <User Schedule>  senna 2 Tablet(s) Oral at bedtime  warfarin 5 milliGRAM(s) Oral <User Schedule>    MEDICATIONS  (PRN):  acetaminophen     Tablet .. 650 milliGRAM(s) Oral every 6 hours PRN Temp greater or equal to 38C (100.4F), Mild Pain (1 - 3)  bisacodyl Suppository 10 milliGRAM(s) Rectal daily PRN Constipation    Vital Signs Last 24 Hrs  T(F): 97.6 (28 Jun 2022 08:26), Max: 98.1 (27 Jun 2022 19:23)  HR: 79 (28 Jun 2022 08:26) (79 - 90)  BP: 116/80 (28 Jun 2022 08:26) (116/80 - 118/95)  RR: 14 (28 Jun 2022 08:26) (14 - 15)  SpO2: 95% (28 Jun 2022 08:26) (95% - 99%)  I&O's Summary      PHYSICAL EXAM:  General: NAD, A/O x 3  ENT: MMM, no scleral icterus  Neck: Supple, No JVD, no thyroidomegaly  Lungs: Clear to auscultation bilaterally, no wheezes, no rales, no rhonchi, good inspiratory effort  Cardio: RRR, S1/S2, No murmurs  Abdomen: Soft, Nontender, Nondistended; Bowel sounds present  Extremities: No calf tenderness, No pitting edema, no skin changes    LABS:                        14.3   6.80  )-----------( 147      ( 27 Jun 2022 06:50 )             42.4       06-27    138  |  103  |  18  ----------------------------<  95  4.6   |  30  |  0.91    Ca    10.1      27 Jun 2022 06:50    TPro  6.7  /  Alb  3.4  /  TBili  0.7  /  DBili  x   /  AST  29  /  ALT  60  /  AlkPhos  61  06-27     PT/INR - ( 27 Jun 2022 06:50 )   PT: 23.8 sec;   INR: 2.04 ratio    PTT - ( 26 Jun 2022 12:15 )  PTT:31.2 sec     COVID-19 PCR: NotDetec (06-21-22 @ 20:40)  COVID-19 PCR: NotDetec (06-21-22 @ 20:40)      Care Discussed with Consultants/Other Providers: Rehab  
Patient is a 81y old  Male who presents with a chief complaint of C2 fracture and epidural hematoma (29 Jun 2022 12:04)      No events overnight  Denies chest pain, SOB    Patient seen and examined at bedside.    ALLERGIES:  No Known Allergies    MEDICATIONS  (STANDING):  gabapentin 100 milliGRAM(s) Oral at bedtime  lidocaine   4% Patch 1 Patch Transdermal <User Schedule>  melatonin 3 milliGRAM(s) Oral at bedtime  metoprolol succinate ER 25 milliGRAM(s) Oral daily  pantoprazole    Tablet 40 milliGRAM(s) Oral before breakfast  polyethylene glycol 3350 17 Gram(s) Oral <User Schedule>  senna 2 Tablet(s) Oral at bedtime    MEDICATIONS  (PRN):  acetaminophen     Tablet .. 650 milliGRAM(s) Oral every 6 hours PRN Temp greater or equal to 38C (100.4F), Mild Pain (1 - 3)  bisacodyl Suppository 10 milliGRAM(s) Rectal daily PRN Constipation    Vital Signs Last 24 Hrs  T(C): 36.4 (30 Jun 2022 19:49), Max: 36.4 (30 Jun 2022 19:49)  T(F): 97.5 (30 Jun 2022 19:49), Max: 97.5 (30 Jun 2022 19:49)  HR: 92 (01 Jul 2022 05:29) (91 - 92)  BP: 114/80 (01 Jul 2022 05:29) (114/80 - 117/79)  BP(mean): --  RR: 16 (30 Jun 2022 19:49) (16 - 16)  SpO2: 99% (30 Jun 2022 19:49) (99% - 99%)      PHYSICAL EXAM:  General: NAD, A/O x 3  ENT: MMM, no scleral icterus  Neck: Supple, No JVD, no thyroidomegaly  Lungs: Clear to auscultation bilaterally, no wheezes, no rales, no rhonchi, good inspiratory effort  Cardio: RRR, S1/S2, No murmurs  Abdomen: Soft, Nontender, Nondistended; Bowel sounds present  Extremities: Right hand weakness improved, No calf tenderness, No pitting edema, no skin changes    LABS:                        14.0   5.47  )-----------( 154      ( 30 Jun 2022 06:43 )             41.3       06-30    139  |  106  |  16  ----------------------------<  98  4.3   |  25  |  1.01    Ca    9.9      30 Jun 2022 06:43    TPro  6.6  /  Alb  3.3  /  TBili  0.6  /  DBili  x   /  AST  22  /  ALT  34  /  AlkPhos  63  06-30       PT/INR - ( 30 Jun 2022 06:43 )   PT: 27.7 sec;   INR: 2.37 ratio      COVID-19 PCR: NotDetec (06-28-22 @ 06:00)  COVID-19 PCR: NotDetec (06-21-22 @ 20:40)  COVID-19 PCR: NotDetec (06-28-22 @ 06:00)  COVID-19 PCR: NotDetec (06-21-22 @ 20:40)      Care Discussed with Consultants/Other Providers: Rehab  
SUBJECTIVE:  Patient seen and examined,   No acute complaint, no dizziness  Tolerating diet and therapy, had normal BM today    Observed in therapy, practising with exercise bicycle, ambulating functional distance, supervision    INR 2.37 Therapeutic,6/30    ROS: Reports no headache, dizziness, chest pain, dyspnea, abd pain, dysuria.  + R shoulder pain, RUE weakness    PHYSICAL EXAM  Constitutional - NAD, Comfortable  HEENT - NAD  Neck - Supple, No limited ROM  Chest - clear   Cardio - Regular HR, irregular rhythm  Abdomen -  Soft,  non tender   Extremities - No peripheral edema, No calf tenderness. +right shin skin tear, surface area reduced    Neurologic Exam: AAOx4  MMT:          LEFT    UE - ShAB 5/5, EF 5/5, EE 5/5, WE 5/5,  WNL                    RIGHT UE - ShAB 4/5 (limited by pain with AROM restricted), EF 5/5, EE 5/5, WE 5/5,  3/5, finger abduction 4/5                    Bilateral Lower extremities - 5/5     Sensory - Intact to LT bilaterally  Psychiatric - Mood stable                        14.0   5.47  )-----------( 154      ( 30 Jun 2022 06:43 )             41.3     06-30    139  |  106  |  16  ----------------------------<  98  4.3   |  25  |  1.01    Ca    9.9      30 Jun 2022 06:43    TPro  6.6  /  Alb  3.3  /  TBili  0.6  /  DBili  x   /  AST  22  /  ALT  34  /  AlkPhos  63  06-30    PT/INR - ( 30 Jun 2022 06:43 )   PT: 27.7 sec;   INR: 2.37 ratio      MEDICATIONS  (STANDING):  gabapentin 100 milliGRAM(s) Oral at bedtime  lidocaine   4% Patch 1 Patch Transdermal <User Schedule>  melatonin 3 milliGRAM(s) Oral at bedtime  metoprolol succinate ER 25 milliGRAM(s) Oral daily  pantoprazole    Tablet 40 milliGRAM(s) Oral before breakfast  polyethylene glycol 3350 17 Gram(s) Oral <User Schedule>  senna 2 Tablet(s) Oral at bedtime  warfarin 5 milliGRAM(s) Oral <User Schedule>    MEDICATIONS  (PRN):  acetaminophen     Tablet .. 650 milliGRAM(s) Oral every 6 hours PRN Temp greater or equal to 38C (100.4F), Mild Pain (1 - 3)  bisacodyl Suppository 10 milliGRAM(s) Rectal daily PRN Constipation  
SUBJECTIVE:  Patient seen and examined,  No interval med events,  Patient requests dc home Sat 7/2 instead of 7/1 as previously anticipated at IDT yesterday  No drowziness today  Tolerating diet and therapy, had normal BM today  Declined blood draw for INR today    Discussed details of IDT yesterday,   Hospitalist d/w pharmacy, and they agreed to dispense coumadin today  Patient agrees to INR tomorrow    ROS: Reports no headache, dizziness, chest pain, dyspnea, abd pain, dysuria.  + R shoulder pain, RUE weakness    PHYSICAL EXAM  Constitutional - NAD, Comfortable  HEENT - NAD  Neck - Supple, No limited ROM  Chest - clear   Cardio - Regular HR, irregular rhythm  Abdomen -  Soft,  non tender   Extremities - No peripheral edema, No calf tenderness. +right shin skin tear dressing c/d/i.     Neurologic Exam: AAOx4  MMT:          LEFT    UE - ShAB 5/5, EF 5/5, EE 5/5, WE 5/5,  WNL                    RIGHT UE - ShAB 3/5 (limited by pain with AROM restricted), EF 5/5, EE 5/5, WE 5/5,  3/5, finger abduction 3/5                    Bilateral Lower extremities - 5/5     Sensory - Intact to LT bilaterally  Psychiatric - Mood stable                        14.3   6.80  )-----------( 147      ( 27 Jun 2022 06:50 )             42.4     06-27    138  |  103  |  18  ----------------------------<  95  4.6   |  30  |  0.91    Ca    10.1      27 Jun 2022 06:50    TPro  6.7  /  Alb  3.4  /  TBili  0.7  /  DBili  x   /  AST  29  /  ALT  60<H>  /  AlkPhos  61  06-27    PT/INR - ( 27 Jun 2022 06:50 )   PT: 23.8 sec;   INR: 2.04 ratio         PTT - ( 26 Jun 2022 12:15 )  PTT:31.2 sec    MEDICATIONS  (STANDING):  gabapentin 100 milliGRAM(s) Oral at bedtime  lidocaine   4% Patch 1 Patch Transdermal <User Schedule>  melatonin 3 milliGRAM(s) Oral at bedtime  metoprolol succinate ER 25 milliGRAM(s) Oral daily  pantoprazole    Tablet 40 milliGRAM(s) Oral before breakfast  polyethylene glycol 3350 17 Gram(s) Oral <User Schedule>  senna 2 Tablet(s) Oral at bedtime  warfarin 2.5 milliGRAM(s) Oral once    MEDICATIONS  (PRN):  acetaminophen     Tablet .. 650 milliGRAM(s) Oral every 6 hours PRN Temp greater or equal to 38C (100.4F), Mild Pain (1 - 3)  bisacodyl Suppository 10 milliGRAM(s) Rectal daily PRN Constipation            
SUBJECTIVE:  Patient seen and examined, observed in therapy  Reports mild dizziness am, but agrees to continue low dose gabapentin for LUE neuropathy  Tolerating diet and therapy, had normal BM today    INR 2.37 Therapeutic, all other labs unremarkable    ROS: Reports no headache, dizziness, chest pain, dyspnea, abd pain, dysuria.  + R shoulder pain, RUE weakness    PHYSICAL EXAM  Constitutional - NAD, Comfortable  HEENT - NAD  Neck - Supple, No limited ROM  Chest - clear   Cardio - Regular HR, irregular rhythm  Abdomen -  Soft,  non tender   Extremities - No peripheral edema, No calf tenderness. +right shin skin tear, surface area reduced    Neurologic Exam: AAOx4  MMT:          LEFT    UE - ShAB 5/5, EF 5/5, EE 5/5, WE 5/5,  WNL                    RIGHT UE - ShAB 3/5 (limited by pain with AROM restricted), EF 5/5, EE 5/5, WE 5/5,  3/5, finger abduction 3/5                    Bilateral Lower extremities - 5/5     Sensory - Intact to LT bilaterally  Psychiatric - Mood stable                        RECENT LABS/IMAGING                        14.0   5.47  )-----------( 154      ( 30 Jun 2022 06:43 )             41.3     06-30    139  |  106  |  16  ----------------------------<  98  4.3   |  25  |  1.01    Ca    9.9      30 Jun 2022 06:43    TPro  6.6  /  Alb  3.3  /  TBili  0.6  /  DBili  x   /  AST  22  /  ALT  34  /  AlkPhos  63  06-30    PT/INR - ( 30 Jun 2022 06:43 )   PT: 27.7 sec;   INR: 2.37 ratio    MEDICATIONS  (STANDING):  gabapentin 100 milliGRAM(s) Oral at bedtime  lidocaine   4% Patch 1 Patch Transdermal <User Schedule>  melatonin 3 milliGRAM(s) Oral at bedtime  metoprolol succinate ER 25 milliGRAM(s) Oral daily  pantoprazole    Tablet 40 milliGRAM(s) Oral before breakfast  polyethylene glycol 3350 17 Gram(s) Oral <User Schedule>  senna 2 Tablet(s) Oral at bedtime  warfarin 5 milliGRAM(s) Oral <User Schedule>    MEDICATIONS  (PRN):  acetaminophen     Tablet .. 650 milliGRAM(s) Oral every 6 hours PRN Temp greater or equal to 38C (100.4F), Mild Pain (1 - 3)  bisacodyl Suppository 10 milliGRAM(s) Rectal daily PRN Constipation                 
SUBJECTIVE:  Patient seen and examined, wife participated on phone during review  No interval med events  Tolerating diet and therapy, had normal BM today  Reports mild drowziness on waking up, but prefers to continue with gabapentin for now   Requests alternate day INR monitoring due to distress of frequent blood draw    Engaging in therapy, ambulating increasing distance, often without gait device  Working on endurance    Asked about driving, but I informed him that neurosurgery will make decision on this, considering that he is still on spinal precautions, head turns may be an issue with driving, he agreed to d/w neurosurgery  He asked for early dc for Fri 7/1 and for home therapy to be split b/w his two home addresses, SW will f/u on this    Labs - unremarkable, INR being monitoring 2.04 today on warfarin 6mg, will continue same    ROS: Reports no headache, dizziness, chest pain, dyspnea, abd pain, dysuria.  + R shoulder pain, RUE weakness     Vital Signs Last 24 Hrs  T(C): 36.4 (27 Jun 2022 08:32), Max: 36.7 (26 Jun 2022 19:16)  T(F): 97.5 (27 Jun 2022 08:32), Max: 98 (26 Jun 2022 19:16)  HR: 78 (27 Jun 2022 08:32) (78 - 95)  BP: 121/84 (27 Jun 2022 08:32) (110/73 - 121/84)  RR: 15 (27 Jun 2022 08:32) (15 - 16)  SpO2: 99% (27 Jun 2022 08:32) (97% - 99%)      PHYSICAL EXAM  Constitutional - NAD, Comfortable  HEENT - [place]ALDE[place]  Neck - Supple, No limited ROM  Chest - clear   Cardio - Regular HR, irregular rhythm  Abdomen -  Soft,  non tender   Extremities - No peripheral edema, No calf tenderness. +right shin skin tear dressing c/d/i.     Neurologic Exam: AAOx4  MMT:          LEFT    UE - ShAB 5/5, EF 5/5, EE 5/5, WE 5/5,  WNL                    RIGHT UE - ShAB 3/5 (limited by pain with AROM restricted), EF 5/5, EE 5/5, WE 4/5,  3/5, finger abduction 2/5                    Bilateral Lower extremities - 5/5     Sensory - Intact to LT bilaterally  Psychiatric - Mood stable                        14.3   6.80  )-----------( 147      ( 27 Jun 2022 06:50 )             42.4     06-27    138  |  103  |  18  ----------------------------<  95  4.6   |  30  |  0.91    Ca    10.1      27 Jun 2022 06:50    TPro  6.7  /  Alb  3.4  /  TBili  0.7  /  DBili  x   /  AST  29  /  ALT  60<H>  /  AlkPhos  61  06-27    PT/INR - ( 27 Jun 2022 06:50 )   PT: 23.8 sec;   INR: 2.04 ratio         PTT - ( 26 Jun 2022 12:15 )  PTT:31.2 sec    MEDICATIONS  (STANDING):  gabapentin 300 milliGRAM(s) Oral at bedtime  lidocaine   4% Patch 1 Patch Transdermal <User Schedule>  melatonin 3 milliGRAM(s) Oral at bedtime  metoprolol succinate ER 25 milliGRAM(s) Oral daily  pantoprazole    Tablet 40 milliGRAM(s) Oral before breakfast  polyethylene glycol 3350 17 Gram(s) Oral <User Schedule>  senna 2 Tablet(s) Oral at bedtime    MEDICATIONS  (PRN):  acetaminophen     Tablet .. 650 milliGRAM(s) Oral every 6 hours PRN Temp greater or equal to 38C (100.4F), Mild Pain (1 - 3)  bisacodyl Suppository 10 milliGRAM(s) Rectal daily PRN Constipation

## 2022-07-02 NOTE — PROGRESS NOTE ADULT - PROVIDER SPECIALTY LIST ADULT
Hospitalist
Rehab Medicine
Hospitalist
Rehab Medicine
Hospitalist
Rehab Medicine
Rehab Medicine

## 2022-07-02 NOTE — PROGRESS NOTE ADULT - NSPROGADDITIONALINFOA_GEN_ALL_CORE
I have personally interviewed and examined this patient, reviewed pertinent clinical information, and performed the evaluation and management services provided at today's visit for inpatient medical follow up    I am available to discuss any issues related to the medical care of this patient on the unit, or by phone at [phone]

## 2022-07-02 NOTE — PROGRESS NOTE ADULT - REASON FOR ADMISSION
C2 fracture and epidural hematoma

## 2022-07-02 NOTE — PROGRESS NOTE ADULT - ASSESSMENT
82 yo RHD Male with PMhx of Afib on Warfarin, HTN, right rotator cuff tear presented to [facility] 6/15/22 after he hit the back of his head falling off a chair. Found to have C2 fracture and transferred to [facility], non surgical management. Hospital course c/b RUE weakness and bilateral shoulder pain. Found to have right rotator cuff tear. MRI R brachial plexus unremarkable. Plan for outpatient EMG. C Maintain [place] J collar at all times. Admitted for multidisciplinary rehab program 6/21/22.    Hyponatremia, resolved  - Will monitor Na  - Encourage oral intake    Chronic stable Afib, rate controlled  - metoprolol succinate ER 25 mg daily  - At home patient takes  5mg 5 days/week and 2.5mg 2 days per week.   - Coumadin 5mg tonight, noted INR yesterday 2.37 ( he does not want to check INR daily)    C2 Fracture  - medically stable for discharge  - C Collar for OOB activity- clarified with patient's neurosurgery team. May remove C collar while in bed or for showers when supervised, Continue C collar for 6-8 weeks until seen by neurosurgeon.  - Outpatient follow up with Dr. So ([phone], press 9) was previously recommended but patient has an appointment in [place] to see spine specialist  - Precautions: Cervical spine, falls, aspiration    Right hand weakness  - Will need UE EMG in 3-4 weeks  - Patient reports feeling drowzy with gabapentin 300mg at night; decreased to 100mg qhs  - MRI of brachial plexus on 6/17 unremarkable    Right rotator cuff tear/Bilateral shoulder pain  - Lidocaine patches to both shoulders  - Cold/warm compresses PRN    Migraines  - Pt takes Immitrex 25mg daily as needed, may repeat after 2 hrs if headache not aborted  - Hold medication, order PRN if needed    DVT prophylaxis: Coumadin dosing per patient has own home INR device and regulates on his own; advised f/u w/ Cardio Dr Goldberg

## 2022-07-05 PROBLEM — M54.2 NECK PAIN: Status: ACTIVE | Noted: 2022-07-05

## 2022-07-11 ENCOUNTER — APPOINTMENT (OUTPATIENT)
Dept: ORTHOPEDIC SURGERY | Facility: CLINIC | Age: 82
End: 2022-07-11

## 2022-07-11 DIAGNOSIS — M54.2 CERVICALGIA: ICD-10-CM

## 2022-07-15 PROCEDURE — 36415 COLL VENOUS BLD VENIPUNCTURE: CPT

## 2022-07-15 PROCEDURE — 97535 SELF CARE MNGMENT TRAINING: CPT

## 2022-07-15 PROCEDURE — 85610 PROTHROMBIN TIME: CPT

## 2022-07-15 PROCEDURE — 97116 GAIT TRAINING THERAPY: CPT

## 2022-07-15 PROCEDURE — U0003: CPT

## 2022-07-15 PROCEDURE — 97112 NEUROMUSCULAR REEDUCATION: CPT

## 2022-07-15 PROCEDURE — U0005: CPT

## 2022-07-15 PROCEDURE — 97530 THERAPEUTIC ACTIVITIES: CPT

## 2022-07-15 PROCEDURE — 97124 MASSAGE THERAPY: CPT

## 2022-07-15 PROCEDURE — 80048 BASIC METABOLIC PNL TOTAL CA: CPT

## 2022-07-15 PROCEDURE — 97110 THERAPEUTIC EXERCISES: CPT

## 2022-07-15 PROCEDURE — 85730 THROMBOPLASTIN TIME PARTIAL: CPT

## 2022-07-15 PROCEDURE — 97167 OT EVAL HIGH COMPLEX 60 MIN: CPT

## 2022-07-15 PROCEDURE — 85025 COMPLETE CBC W/AUTO DIFF WBC: CPT

## 2022-07-15 PROCEDURE — 97163 PT EVAL HIGH COMPLEX 45 MIN: CPT

## 2022-07-15 PROCEDURE — 80053 COMPREHEN METABOLIC PANEL: CPT

## 2022-07-21 NOTE — HISTORY OF PRESENT ILLNESS
[de-identified] : Pt is a 81 year old male who presents to the office today for an initial evaluation of neck pain.\par \par No fever, chills, sweats, nausea/vomiting. No bowel or bladder dysfunction, no recent weight loss or gain. No night pain. This history is in addition to the intake form that I personally reviewed.\par

## 2022-07-21 NOTE — REVIEW OF SYSTEMS
[Joint Pain] : joint pain [Joint Stiffness] : joint stiffness [Negative] : Heme/Lymph [[email or web address]] : neck pain

## 2022-07-21 NOTE — PHYSICAL EXAM
[de-identified] : 5 out of 5 motor strength,sensation is intact and symmetrical full range of motion flexion extension and rotation, no palpatory tenderness full range of motion of hips knees shoulders and elbows (all four extremities), no atrophy, negative straight leg raise, no pathological reflexes, no swelling, normal ambulation, no apparent distress skin intact, no palpable lymph nodes, no upper or lower extremity instability, alert and oriented x 3 and normal mood. Normal finger-to nose test.\par

## 2024-03-17 NOTE — DISCHARGE NOTE PROVIDER - NSDCHOSPICE_GEN_A_CORE
No FAMILY HISTORY:  Grandparent  Still living? Unknown  Family history of essential hypertension, Age at diagnosis: Age Unknown

## 2024-07-18 NOTE — DISCHARGE NOTE NURSING/CASE MANAGEMENT/SOCIAL WORK - NSDCPEPTCOWACOMP_GEN_ALL_CORE
No - the patient is unable to be screened due to medical condition
The patient has received written discharge instructions for Warfarin/Coumadin, including the Warfarin/Coumadin discharge booklet, which contains all of the information listed below. Warfarin/Coumadin is used to prevent new blood clots and keep existing ones from getting bigger. Never skip a dose of Warfarin/Coumadin. If you forget to take your Warfarin/Coumadin, DO NOT take an extra pill to 'catch up'. NEVER TAKE A DOUBLE DOSE. Notify your doctor that you missed a dose. Take Warfarin/Coumadin in the evening at the same time. Warfarin/Coumadin may be taken with other medications or food.

## 2024-08-07 ENCOUNTER — APPOINTMENT (OUTPATIENT)
Dept: NEUROLOGY | Facility: CLINIC | Age: 84
End: 2024-08-07